# Patient Record
Sex: FEMALE | Race: WHITE | NOT HISPANIC OR LATINO | URBAN - METROPOLITAN AREA
[De-identification: names, ages, dates, MRNs, and addresses within clinical notes are randomized per-mention and may not be internally consistent; named-entity substitution may affect disease eponyms.]

---

## 2018-11-21 ENCOUNTER — INPATIENT (INPATIENT)
Facility: HOSPITAL | Age: 83
LOS: 5 days | Discharge: NURSING, REHAB OR LONG-TERM CARE | DRG: 185 | End: 2018-11-27
Attending: INTERNAL MEDICINE | Admitting: INTERNAL MEDICINE
Payer: MEDICARE

## 2018-11-21 VITALS
RESPIRATION RATE: 18 BRPM | DIASTOLIC BLOOD PRESSURE: 64 MMHG | OXYGEN SATURATION: 94 % | HEIGHT: 60 IN | TEMPERATURE: 99 F | HEART RATE: 54 BPM | SYSTOLIC BLOOD PRESSURE: 133 MMHG | WEIGHT: 115.08 LBS

## 2018-11-21 DIAGNOSIS — S22.39XA FRACTURE OF ONE RIB, UNSPECIFIED SIDE, INITIAL ENCOUNTER FOR CLOSED FRACTURE: ICD-10-CM

## 2018-11-21 LAB
ALBUMIN SERPL ELPH-MCNC: 3.6 G/DL — SIGNIFICANT CHANGE UP (ref 3.3–5)
ALP SERPL-CCNC: 52 U/L — SIGNIFICANT CHANGE UP (ref 40–120)
ALT FLD-CCNC: 18 U/L — SIGNIFICANT CHANGE UP (ref 12–78)
ANION GAP SERPL CALC-SCNC: 7 MMOL/L — SIGNIFICANT CHANGE UP (ref 5–17)
AST SERPL-CCNC: 18 U/L — SIGNIFICANT CHANGE UP (ref 15–37)
BASOPHILS # BLD AUTO: 0.05 K/UL — SIGNIFICANT CHANGE UP (ref 0–0.2)
BASOPHILS NFR BLD AUTO: 0.7 % — SIGNIFICANT CHANGE UP (ref 0–2)
BILIRUB SERPL-MCNC: 0.7 MG/DL — SIGNIFICANT CHANGE UP (ref 0.2–1.2)
BUN SERPL-MCNC: 23 MG/DL — SIGNIFICANT CHANGE UP (ref 7–23)
CALCIUM SERPL-MCNC: 8.9 MG/DL — SIGNIFICANT CHANGE UP (ref 8.5–10.1)
CHLORIDE SERPL-SCNC: 106 MMOL/L — SIGNIFICANT CHANGE UP (ref 96–108)
CO2 SERPL-SCNC: 27 MMOL/L — SIGNIFICANT CHANGE UP (ref 22–31)
CREAT SERPL-MCNC: 0.77 MG/DL — SIGNIFICANT CHANGE UP (ref 0.5–1.3)
EOSINOPHIL # BLD AUTO: 0.06 K/UL — SIGNIFICANT CHANGE UP (ref 0–0.5)
EOSINOPHIL NFR BLD AUTO: 0.8 % — SIGNIFICANT CHANGE UP (ref 0–6)
GLUCOSE SERPL-MCNC: 99 MG/DL — SIGNIFICANT CHANGE UP (ref 70–99)
HCT VFR BLD CALC: 44.9 % — SIGNIFICANT CHANGE UP (ref 34.5–45)
HGB BLD-MCNC: 14.2 G/DL — SIGNIFICANT CHANGE UP (ref 11.5–15.5)
IMM GRANULOCYTES NFR BLD AUTO: 0.3 % — SIGNIFICANT CHANGE UP (ref 0–1.5)
LYMPHOCYTES # BLD AUTO: 1.62 K/UL — SIGNIFICANT CHANGE UP (ref 1–3.3)
LYMPHOCYTES # BLD AUTO: 21.5 % — SIGNIFICANT CHANGE UP (ref 13–44)
MCHC RBC-ENTMCNC: 31.1 PG — SIGNIFICANT CHANGE UP (ref 27–34)
MCHC RBC-ENTMCNC: 31.6 GM/DL — LOW (ref 32–36)
MCV RBC AUTO: 98.2 FL — SIGNIFICANT CHANGE UP (ref 80–100)
MONOCYTES # BLD AUTO: 0.45 K/UL — SIGNIFICANT CHANGE UP (ref 0–0.9)
MONOCYTES NFR BLD AUTO: 6 % — SIGNIFICANT CHANGE UP (ref 2–14)
NEUTROPHILS # BLD AUTO: 5.35 K/UL — SIGNIFICANT CHANGE UP (ref 1.8–7.4)
NEUTROPHILS NFR BLD AUTO: 70.7 % — SIGNIFICANT CHANGE UP (ref 43–77)
PLATELET # BLD AUTO: 167 K/UL — SIGNIFICANT CHANGE UP (ref 150–400)
POTASSIUM SERPL-MCNC: 5 MMOL/L — SIGNIFICANT CHANGE UP (ref 3.5–5.3)
POTASSIUM SERPL-SCNC: 5 MMOL/L — SIGNIFICANT CHANGE UP (ref 3.5–5.3)
PROT SERPL-MCNC: 7 G/DL — SIGNIFICANT CHANGE UP (ref 6–8.3)
RBC # BLD: 4.57 M/UL — SIGNIFICANT CHANGE UP (ref 3.8–5.2)
RBC # FLD: 13.5 % — SIGNIFICANT CHANGE UP (ref 10.3–14.5)
SODIUM SERPL-SCNC: 140 MMOL/L — SIGNIFICANT CHANGE UP (ref 135–145)
WBC # BLD: 7.55 K/UL — SIGNIFICANT CHANGE UP (ref 3.8–10.5)
WBC # FLD AUTO: 7.55 K/UL — SIGNIFICANT CHANGE UP (ref 3.8–10.5)

## 2018-11-21 PROCEDURE — 70450 CT HEAD/BRAIN W/O DYE: CPT | Mod: 26

## 2018-11-21 PROCEDURE — 93010 ELECTROCARDIOGRAM REPORT: CPT | Mod: 76

## 2018-11-21 PROCEDURE — 74177 CT ABD & PELVIS W/CONTRAST: CPT | Mod: 26

## 2018-11-21 PROCEDURE — 71045 X-RAY EXAM CHEST 1 VIEW: CPT | Mod: 26

## 2018-11-21 PROCEDURE — 99285 EMERGENCY DEPT VISIT HI MDM: CPT

## 2018-11-21 PROCEDURE — 71260 CT THORAX DX C+: CPT | Mod: 26

## 2018-11-21 PROCEDURE — 72125 CT NECK SPINE W/O DYE: CPT | Mod: 26

## 2018-11-21 RX ORDER — SIMVASTATIN 20 MG/1
40 TABLET, FILM COATED ORAL AT BEDTIME
Qty: 0 | Refills: 0 | Status: DISCONTINUED | OUTPATIENT
Start: 2018-11-21 | End: 2018-11-27

## 2018-11-21 RX ORDER — ACETAMINOPHEN 500 MG
650 TABLET ORAL EVERY 6 HOURS
Qty: 0 | Refills: 0 | Status: DISCONTINUED | OUTPATIENT
Start: 2018-11-21 | End: 2018-11-27

## 2018-11-21 RX ORDER — CLOPIDOGREL BISULFATE 75 MG/1
75 TABLET, FILM COATED ORAL DAILY
Qty: 0 | Refills: 0 | Status: DISCONTINUED | OUTPATIENT
Start: 2018-11-21 | End: 2018-11-27

## 2018-11-21 RX ORDER — MORPHINE SULFATE 50 MG/1
2 CAPSULE, EXTENDED RELEASE ORAL ONCE
Qty: 0 | Refills: 0 | Status: DISCONTINUED | OUTPATIENT
Start: 2018-11-21 | End: 2018-11-21

## 2018-11-21 RX ORDER — BUDESONIDE AND FORMOTEROL FUMARATE DIHYDRATE 160; 4.5 UG/1; UG/1
2 AEROSOL RESPIRATORY (INHALATION)
Qty: 0 | Refills: 0 | Status: DISCONTINUED | OUTPATIENT
Start: 2018-11-21 | End: 2018-11-27

## 2018-11-21 RX ORDER — QUINAPRIL HYDROCHLORIDE 40 MG/1
1 TABLET, FILM COATED ORAL
Qty: 0 | Refills: 0 | COMMUNITY

## 2018-11-21 RX ORDER — SIMVASTATIN 20 MG/1
1 TABLET, FILM COATED ORAL
Qty: 0 | Refills: 0 | COMMUNITY

## 2018-11-21 RX ORDER — LIDOCAINE 4 G/100G
1 CREAM TOPICAL ONCE
Qty: 0 | Refills: 0 | Status: COMPLETED | OUTPATIENT
Start: 2018-11-21 | End: 2018-11-21

## 2018-11-21 RX ORDER — LISINOPRIL 2.5 MG/1
10 TABLET ORAL DAILY
Qty: 0 | Refills: 0 | Status: DISCONTINUED | OUTPATIENT
Start: 2018-11-21 | End: 2018-11-27

## 2018-11-21 RX ORDER — CLOPIDOGREL BISULFATE 75 MG/1
1 TABLET, FILM COATED ORAL
Qty: 0 | Refills: 0 | COMMUNITY

## 2018-11-21 RX ORDER — SODIUM CHLORIDE 9 MG/ML
1000 INJECTION INTRAMUSCULAR; INTRAVENOUS; SUBCUTANEOUS ONCE
Qty: 0 | Refills: 0 | Status: COMPLETED | OUTPATIENT
Start: 2018-11-21 | End: 2018-11-21

## 2018-11-21 RX ORDER — ATENOLOL 25 MG/1
50 TABLET ORAL DAILY
Qty: 0 | Refills: 0 | Status: DISCONTINUED | OUTPATIENT
Start: 2018-11-21 | End: 2018-11-22

## 2018-11-21 RX ORDER — ENOXAPARIN SODIUM 100 MG/ML
40 INJECTION SUBCUTANEOUS DAILY
Qty: 0 | Refills: 0 | Status: DISCONTINUED | OUTPATIENT
Start: 2018-11-21 | End: 2018-11-27

## 2018-11-21 RX ORDER — OXYCODONE AND ACETAMINOPHEN 5; 325 MG/1; MG/1
1 TABLET ORAL ONCE
Qty: 0 | Refills: 0 | Status: DISCONTINUED | OUTPATIENT
Start: 2018-11-21 | End: 2018-11-21

## 2018-11-21 RX ORDER — ONDANSETRON 8 MG/1
4 TABLET, FILM COATED ORAL ONCE
Qty: 0 | Refills: 0 | Status: COMPLETED | OUTPATIENT
Start: 2018-11-21 | End: 2018-11-21

## 2018-11-21 RX ORDER — OXYCODONE AND ACETAMINOPHEN 5; 325 MG/1; MG/1
1 TABLET ORAL EVERY 6 HOURS
Qty: 0 | Refills: 0 | Status: DISCONTINUED | OUTPATIENT
Start: 2018-11-21 | End: 2018-11-27

## 2018-11-21 RX ORDER — ONDANSETRON 8 MG/1
4 TABLET, FILM COATED ORAL EVERY 6 HOURS
Qty: 0 | Refills: 0 | Status: DISCONTINUED | OUTPATIENT
Start: 2018-11-21 | End: 2018-11-27

## 2018-11-21 RX ORDER — MORPHINE SULFATE 50 MG/1
2 CAPSULE, EXTENDED RELEASE ORAL EVERY 4 HOURS
Qty: 0 | Refills: 0 | Status: DISCONTINUED | OUTPATIENT
Start: 2018-11-21 | End: 2018-11-27

## 2018-11-21 RX ADMIN — SODIUM CHLORIDE 1000 MILLILITER(S): 9 INJECTION INTRAMUSCULAR; INTRAVENOUS; SUBCUTANEOUS at 15:50

## 2018-11-21 RX ADMIN — MORPHINE SULFATE 2 MILLIGRAM(S): 50 CAPSULE, EXTENDED RELEASE ORAL at 16:09

## 2018-11-21 RX ADMIN — MORPHINE SULFATE 2 MILLIGRAM(S): 50 CAPSULE, EXTENDED RELEASE ORAL at 18:06

## 2018-11-21 RX ADMIN — OXYCODONE AND ACETAMINOPHEN 1 TABLET(S): 5; 325 TABLET ORAL at 20:48

## 2018-11-21 RX ADMIN — ONDANSETRON 4 MILLIGRAM(S): 8 TABLET, FILM COATED ORAL at 15:50

## 2018-11-21 RX ADMIN — LIDOCAINE 1 PATCH: 4 CREAM TOPICAL at 20:46

## 2018-11-21 RX ADMIN — MORPHINE SULFATE 2 MILLIGRAM(S): 50 CAPSULE, EXTENDED RELEASE ORAL at 18:21

## 2018-11-21 RX ADMIN — MORPHINE SULFATE 2 MILLIGRAM(S): 50 CAPSULE, EXTENDED RELEASE ORAL at 15:49

## 2018-11-21 NOTE — ED PROVIDER NOTE - PROGRESS NOTE DETAILS
results discussed with family, discussed, mass found on lung on ct, call out to Dr Barajas for admission, awaiting call back spoke with Dr ABBEY Barajas, case discussed, will admit patient.

## 2018-11-21 NOTE — ED PROVIDER NOTE - ATTENDING CONTRIBUTION TO CARE
Pt with multiple falls in the last couple of months, here with right rib and abd pain, fell today, but has had this pain since oct 8th. GCA=15, no neck pain, pt moving all 4 ext, will image and send labs.

## 2018-11-21 NOTE — ED PROVIDER NOTE - OBJECTIVE STATEMENT
89 y female presents accompanied to ED by son and daughter in law, son states he was assisting patient in the bathroom today,  she was sitting on the commode, the house phone rang,  he went to answer it,  he heard his mother call him, found her sitting on the bathroom floor, back against the tub.  awake , alert, patient states she has right rib pain, and abdominal pain,  no nausea, no vomiting, states she did not hit her head, no neck pain, patient is on plavix, hx of stents.  PMD DR Carmen Moya

## 2018-11-21 NOTE — ED PROVIDER NOTE - MEDICAL DECISION MAKING DETAILS
mechanical fall, right rib pain, abdominal pain, on plavix, will obtain labs, ct head, neck, chest, abdomen

## 2018-11-21 NOTE — ED PROVIDER NOTE - CARE PLAN
Principal Discharge DX:	Fall, initial encounter  Secondary Diagnosis:	Rib pain on right side  Secondary Diagnosis:	Abdominal pain Principal Discharge DX:	Rib fractures  Secondary Diagnosis:	Rib pain on right side  Secondary Diagnosis:	Abdominal pain  Secondary Diagnosis:	Fall in home, initial encounter

## 2018-11-21 NOTE — ED ADULT NURSE NOTE - OBJECTIVE STATEMENT
Per patient and daughter, pt had unwitnessed fall at home. Pt now complains of right side pain, 10/10, and generalized pain. No other complaints at this time.

## 2018-11-22 DIAGNOSIS — I10 ESSENTIAL (PRIMARY) HYPERTENSION: ICD-10-CM

## 2018-11-22 DIAGNOSIS — R91.8 OTHER NONSPECIFIC ABNORMAL FINDING OF LUNG FIELD: ICD-10-CM
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
## 2018-11-22 DIAGNOSIS — I25.10 ATHEROSCLEROTIC HEART DISEASE OF NATIVE CORONARY ARTERY WITHOUT ANGINA PECTORIS: ICD-10-CM

## 2018-11-22 DIAGNOSIS — S22.39XA FRACTURE OF ONE RIB, UNSPECIFIED SIDE, INITIAL ENCOUNTER FOR CLOSED FRACTURE: ICD-10-CM

## 2018-11-22 DIAGNOSIS — I48.91 UNSPECIFIED ATRIAL FIBRILLATION: ICD-10-CM

## 2018-11-22 DIAGNOSIS — Z29.9 ENCOUNTER FOR PROPHYLACTIC MEASURES, UNSPECIFIED: ICD-10-CM

## 2018-11-22 LAB
ANION GAP SERPL CALC-SCNC: 8 MMOL/L — SIGNIFICANT CHANGE UP (ref 5–17)
BUN SERPL-MCNC: 22 MG/DL — SIGNIFICANT CHANGE UP (ref 7–23)
CALCIUM SERPL-MCNC: 9.3 MG/DL — SIGNIFICANT CHANGE UP (ref 8.5–10.1)
CHLORIDE SERPL-SCNC: 103 MMOL/L — SIGNIFICANT CHANGE UP (ref 96–108)
CO2 SERPL-SCNC: 29 MMOL/L — SIGNIFICANT CHANGE UP (ref 22–31)
CREAT SERPL-MCNC: 0.81 MG/DL — SIGNIFICANT CHANGE UP (ref 0.5–1.3)
GLUCOSE SERPL-MCNC: 82 MG/DL — SIGNIFICANT CHANGE UP (ref 70–99)
HCT VFR BLD CALC: 44.7 % — SIGNIFICANT CHANGE UP (ref 34.5–45)
HGB BLD-MCNC: 14.1 G/DL — SIGNIFICANT CHANGE UP (ref 11.5–15.5)
MAGNESIUM SERPL-MCNC: 2.2 MG/DL — SIGNIFICANT CHANGE UP (ref 1.6–2.6)
MCHC RBC-ENTMCNC: 31.5 GM/DL — LOW (ref 32–36)
MCHC RBC-ENTMCNC: 31.7 PG — SIGNIFICANT CHANGE UP (ref 27–34)
MCV RBC AUTO: 100.4 FL — HIGH (ref 80–100)
NRBC # BLD: 0 /100 WBCS — SIGNIFICANT CHANGE UP (ref 0–0)
PLATELET # BLD AUTO: 180 K/UL — SIGNIFICANT CHANGE UP (ref 150–400)
POTASSIUM SERPL-MCNC: 4.4 MMOL/L — SIGNIFICANT CHANGE UP (ref 3.5–5.3)
POTASSIUM SERPL-SCNC: 4.4 MMOL/L — SIGNIFICANT CHANGE UP (ref 3.5–5.3)
RBC # BLD: 4.45 M/UL — SIGNIFICANT CHANGE UP (ref 3.8–5.2)
RBC # FLD: 13.6 % — SIGNIFICANT CHANGE UP (ref 10.3–14.5)
SODIUM SERPL-SCNC: 140 MMOL/L — SIGNIFICANT CHANGE UP (ref 135–145)
T4 AB SER-ACNC: 5.8 UG/DL — SIGNIFICANT CHANGE UP (ref 4.6–12)
TSH SERPL-MCNC: 2.04 UIU/ML — SIGNIFICANT CHANGE UP (ref 0.36–3.74)
WBC # BLD: 9.03 K/UL — SIGNIFICANT CHANGE UP (ref 3.8–10.5)
WBC # FLD AUTO: 9.03 K/UL — SIGNIFICANT CHANGE UP (ref 3.8–10.5)

## 2018-11-22 PROCEDURE — 93306 TTE W/DOPPLER COMPLETE: CPT | Mod: 26

## 2018-11-22 PROCEDURE — 99223 1ST HOSP IP/OBS HIGH 75: CPT | Mod: 25

## 2018-11-22 RX ORDER — ATENOLOL 25 MG/1
25 TABLET ORAL DAILY
Qty: 0 | Refills: 0 | Status: DISCONTINUED | OUTPATIENT
Start: 2018-11-23 | End: 2018-11-27

## 2018-11-22 RX ORDER — LIDOCAINE 4 G/100G
1 CREAM TOPICAL DAILY
Qty: 0 | Refills: 0 | Status: DISCONTINUED | OUTPATIENT
Start: 2018-11-22 | End: 2018-11-27

## 2018-11-22 RX ADMIN — LIDOCAINE 1 PATCH: 4 CREAM TOPICAL at 23:09

## 2018-11-22 RX ADMIN — ENOXAPARIN SODIUM 40 MILLIGRAM(S): 100 INJECTION SUBCUTANEOUS at 11:33

## 2018-11-22 RX ADMIN — BUDESONIDE AND FORMOTEROL FUMARATE DIHYDRATE 2 PUFF(S): 160; 4.5 AEROSOL RESPIRATORY (INHALATION) at 05:46

## 2018-11-22 RX ADMIN — ATENOLOL 50 MILLIGRAM(S): 25 TABLET ORAL at 05:46

## 2018-11-22 RX ADMIN — MORPHINE SULFATE 2 MILLIGRAM(S): 50 CAPSULE, EXTENDED RELEASE ORAL at 01:47

## 2018-11-22 RX ADMIN — LIDOCAINE 1 PATCH: 4 CREAM TOPICAL at 11:38

## 2018-11-22 RX ADMIN — CLOPIDOGREL BISULFATE 75 MILLIGRAM(S): 75 TABLET, FILM COATED ORAL at 11:33

## 2018-11-22 RX ADMIN — LISINOPRIL 10 MILLIGRAM(S): 2.5 TABLET ORAL at 05:46

## 2018-11-22 RX ADMIN — SIMVASTATIN 40 MILLIGRAM(S): 20 TABLET, FILM COATED ORAL at 23:00

## 2018-11-22 RX ADMIN — LIDOCAINE 1 PATCH: 4 CREAM TOPICAL at 11:33

## 2018-11-22 RX ADMIN — MORPHINE SULFATE 2 MILLIGRAM(S): 50 CAPSULE, EXTENDED RELEASE ORAL at 02:30

## 2018-11-22 RX ADMIN — MORPHINE SULFATE 2 MILLIGRAM(S): 50 CAPSULE, EXTENDED RELEASE ORAL at 23:48

## 2018-11-22 RX ADMIN — BUDESONIDE AND FORMOTEROL FUMARATE DIHYDRATE 2 PUFF(S): 160; 4.5 AEROSOL RESPIRATORY (INHALATION) at 23:00

## 2018-11-22 NOTE — CONSULT NOTE ADULT - ASSESSMENT
Pt admitted after fall with rib fractures. Found to have RUL mass. Possible cancer.  Due to pt's advanced age, would do limited workup and treatment.

## 2018-11-22 NOTE — H&P ADULT - HISTORY OF PRESENT ILLNESS
89 y female presents accompanied to ED by son and daughter in law, son states he was assisting patient in the bathroom today,  she was sitting on the commode, the house phone rang,  he went to answer it,  he heard his mother call him, found her sitting on the bathroom floor, back against the tub.  awake , alert, patient states she has right rib pain, and abdominal pain,  no nausea, no vomiting, states she did not hit her head, no neck pain, patient is on plavix, hx of stents. 89 y female presents accompanied to ED by son and daughter in law, son states he was assisting patient in the bathroom today,  she was sitting on the commode, the house phone rang,  he went to answer it,  he heard his mother call him, found her sitting on the bathroom floor, back against the tub.  awake , alert, patient states she has right rib pain, and abdominal pain,  no nausea, no vomiting, states she did not hit her head, no neck pain, patient is on plavix, hx of stents.     Patient lives alone but family has been taking turns staying at her place taking care of her since her fall in early October. She did not come to the hospital for evaluation at that time. Family reports that they cannot take care of her any longer. She requires a lot of help with her ADLs. They have applied for Medicaid. They were told that the Medicaid aide would be in place in a few weeks. Their goal is to eventually move her back to her apartment with the 24-hour Medicaid aide.

## 2018-11-22 NOTE — H&P ADULT - RESPIRATORY
Addended by: DORA MORENO on: 6/20/2018 10:52 AM     Modules accepted: Orders     Breath Sounds equal & clear to percussion & auscultation, no accessory muscle use

## 2018-11-22 NOTE — PROVIDER CONTACT NOTE (CHANGE IN STATUS NOTIFICATION) - SITUATION
Patient heartrate in 40s. Patient with new Afib.
Patient EKG with lots of artifact. Catalina BROWN made aware. EKG repeated. EKG shows Afib. Patient with no history.

## 2018-11-22 NOTE — H&P ADULT - PROBLEM SELECTOR PLAN 2
Pulmonary consult  Outpatient PET scan  Would recommend limited work-up due to patient's advanced age Pulmonary consult  Outpatient PET scan if family wants  Would recommend limited work-up due to patient's advanced age  Family refusing any work-up

## 2018-11-22 NOTE — CONSULT NOTE ADULT - SUBJECTIVE AND OBJECTIVE BOX
PULMONARY/CRITICAL CARE        Patient is a 89y old  Female who presents with a chief complaint of fall. Found to have rul lung mass. Pt claims she had surgery for lung cancer in past on right. Denies sob, fever, chills.  BRIEF HOSPITAL COURSE: ***    Events last 24 hours: ***    PAST MEDICAL & SURGICAL HISTORY:  Stented coronary artery  HTN (hypertension)    Allergies    No Known Allergies    Intolerances      FAMILY HISTORY and SOCIAL: smoked 1ppd for 25 yrs until 30 yrs ago. No etoh.      Review of Systems:  CONSTITUTIONAL: No fever, chills, or fatigue  EYES: No eye pain, visual disturbances, or discharge  ENMT:  No difficulty hearing, tinnitus, vertigo; No sinus or throat pain  NECK: No pain or stiffness  RESPIRATORY: No cough, wheezing, chills or hemoptysis; No shortness of breath  CARDIOVASCULAR: No chest pain, palpitations, dizziness, or leg swelling  GASTROINTESTINAL: No abdominal or epigastric pain. No nausea, vomiting, or hematemesis; No diarrhea or constipation. No melena or hematochezia.  GENITOURINARY: No dysuria, frequency, hematuria, or incontinence  NEUROLOGICAL: No headaches, memory loss, loss of strength, numbness, or tremors  SKIN: No itching, burning, rashes, or lesions   MUSCULOSKELETAL: No joint pain or swelling; No muscle, back, or extremity pain  PSYCHIATRIC: No depression, anxiety, mood swings, or difficulty sleeping      Medications:    ATENolol  Tablet 50 milliGRAM(s) Oral daily  lisinopril 10 milliGRAM(s) Oral daily    buDESOnide  80 MICROgram(s)/formoterol 4.5 MICROgram(s) Inhaler 2 Puff(s) Inhalation two times a day    acetaminophen   Tablet .. 650 milliGRAM(s) Oral every 6 hours PRN  morphine  - Injectable 2 milliGRAM(s) IV Push every 4 hours PRN  ondansetron Injectable 4 milliGRAM(s) IV Push every 6 hours PRN  oxyCODONE    5 mG/acetaminophen 325 mG 1 Tablet(s) Oral every 6 hours PRN      clopidogrel Tablet 75 milliGRAM(s) Oral daily  enoxaparin Injectable 40 milliGRAM(s) SubCutaneous daily        simvastatin 40 milliGRAM(s) Oral at bedtime        lidocaine   Patch 1 Patch Transdermal daily            ICU Vital Signs Last 24 Hrs  T(C): 36.6 (22 Nov 2018 07:20), Max: 37.1 (21 Nov 2018 14:03)  T(F): 97.8 (22 Nov 2018 07:20), Max: 98.7 (21 Nov 2018 14:03)  HR: 70 (22 Nov 2018 07:20) (54 - 70)  BP: 119/64 (22 Nov 2018 07:20) (117/57 - 153/70)  BP(mean): --  ABP: --  ABP(mean): --  RR: 19 (22 Nov 2018 07:20) (16 - 19)  SpO2: 95% (22 Nov 2018 07:20) (94% - 97%)    Vital Signs Last 24 Hrs  T(C): 36.6 (22 Nov 2018 07:20), Max: 37.1 (21 Nov 2018 14:03)  T(F): 97.8 (22 Nov 2018 07:20), Max: 98.7 (21 Nov 2018 14:03)  HR: 70 (22 Nov 2018 07:20) (54 - 70)  BP: 119/64 (22 Nov 2018 07:20) (117/57 - 153/70)  BP(mean): --  RR: 19 (22 Nov 2018 07:20) (16 - 19)  SpO2: 95% (22 Nov 2018 07:20) (94% - 97%)        I&O's Detail    21 Nov 2018 07:01  -  22 Nov 2018 07:00  --------------------------------------------------------  IN:    Oral Fluid: 200 mL  Total IN: 200 mL    OUT:  Total OUT: 0 mL    Total NET: 200 mL            LABS:                        14.1   9.03  )-----------( 180      ( 22 Nov 2018 07:22 )             44.7     11-22    140  |  103  |  22  ----------------------------<  82  4.4   |  29  |  0.81    Ca    9.3      22 Nov 2018 07:22  Mg     2.2     11-22    TPro  7.0  /  Alb  3.6  /  TBili  0.7  /  DBili  x   /  AST  18  /  ALT  18  /  AlkPhos  52  11-21          CAPILLARY BLOOD GLUCOSE            CULTURES:      Physical Examination:    General: No acute distress.  elderly female    HEENT: Pupils equal, reactive to light.  Symmetric.    PULM: Clear to auscultation bilaterally, no significant sputum production    CVS: Regular rate and rhythm, no murmurs, rubs, or gallops    ABD: Soft, nondistended, nontender, normoactive bowel sounds, no masses    EXT: No edema, nontender    SKIN: Warm and well perfused, no rashes noted.    NEURO: Alert, oriented, interactive, nonfocal    RADIOLOGY: ***< from: CT Chest w/ IV Cont (11.21.18 @ 19:02) >  EXAM:  CT CHEST IC                            *** ADDENDUM 11/21/2018  ***    There are 2 nondisplaced posteriorly situated fractures of right ribs,   11th and 12th ribs.      *** END OF ADDENDUM 11/21/2018  ***      PROCEDURE DATE:  11/21/2018          INTERPRETATION:  Clinical information: Right-sided rib pain    CT study of the chest abdomen and pelvis.    Axial images obtained, coronal and sagittal images computer reformatted.    IV contrast material administered.    95 cc of Omnipaque 350 intravenously administered, 5 cc discarded.    No prior studies present for comparison    Chest: Global cardiomegaly present. No evidence of pericardial effusion.   No evidence of thoracic aortic aneurysm. Central airway intact. Thyroid   gland not enlarged. No mediastinal lesions evident. No hilar lesions   evident.    Pleural base consolidation, right upper lobe. Anterior posterior   dimension, 6 cm, 2.5 cm in width, 3 cm vertical height. Cannot exclude a   neoplasm. This lesion abuts the minor fissure. Advise follow-up   evaluation.     No drainable pleural effusion. No pneumothorax. Bullous changes right   lower lobe. Small fissural fluid collection present.   Severe dorsal   kyphosis present. Compression fracture of T10, follow-up with bone scan.              Abdomen-pelvis: Hepatic parenchyma homogeneous. The gallbladder is filled   with stones. The spleen is not enlarged. The pancreas is atrophic.   Atherosclerotic changes, abdominal aorta, no evidence of aneurysm. No   retroperitoneal lymphadenopathy. No biliary ductal dilatation or ascites.   No adrenal lesions.    Large cyst lower pole of left kidney. No hydronephrotic changes.    No bowel obstruction. Diverticulosis present. Urinary bladder is filled,   no stones evident. Noevidence of free pelvic fluid. Inguinal regions   intact. No acute appearing osseous abnormalities. Calcified disc L5-S1.   Possible hemangioma of the bone, L2.        IMPRESSION: See findings described above.                ***Please see the addendum at the top of this report. It may contain   additional important information or changes.****          MALLY BRYANT M.D.,ATTENDING RADIOLOGIST  This document has been electronically signed. Nov 21 2018  7:24PM  Addend:  MALLY BRYANT M.D.,ATTENDING RADIOLOGIST  This addendum was electronically signed on: Nov 21 2018  7:39PM.        < end of copied text >      CRITICAL CARE TIME SPENT: ***

## 2018-11-22 NOTE — CONSULT NOTE ADULT - ASSESSMENT
Mrs. Barnes is a 89 year old with reported CAD on Plavix, here with a fall. Found to have Lung mass and rib fractures  Found to be in Atrial fibrillation with slow ventricular response on EKG. Unclear if there is an AF history.   The etiology of her fall is unclear, and syncope is a possibility    - Continue to watch on telemetry  - No sign of acute ischemia.  - Slow AF on telemetry, monitor for bradycardia and pauses  - Decrease atenolol to 25 mg po daily  - Would be hesitant to start a/c given her propensity for falls and advanced age  - Continue Plavix 75 mg po daily  - Continue statin  - Can check echocardiogram  - Watch creatinine and electrolytes  - Will follow with you.

## 2018-11-22 NOTE — CONSULT NOTE ADULT - SUBJECTIVE AND OBJECTIVE BOX
[facility] - Shauna Raphael, Sun, Enoc, Ronaldo, Jose Juan Silva  Office Number: [phone]    Initial Consult Note    CHIEF COMPLAINT: Patient is a 89y old  Female who presents with a chief complaint of fall.    HPI:  She is a very poor historian, and history has been obtained from chart.    She is a 89 y female brought in yesterday after fall. She was sitting on the commode, the house phone rang,  the son went to answer it,  he heard his mother call him, found her sitting on the bathroom floor, back against the tub.  awake , alert, patient states she has right rib pain, and abdominal pain,  no nausea, no vomiting, states she did not hit her head, no neck pain, patient is on plavix, hx of stents.      Found to be in slow atrial fibrillation  No history of atrial fibrillation per notes. On Plavix for CAD.  Takes atenolol for HTN  Denies chest pain and difficulty breathing.      PAST MEDICAL & SURGICAL HISTORY:  Stented coronary artery  HTN (hypertension)      SOCIAL HISTORY:  Unknown    FAMILY HISTORY:  Unknown    MEDICATIONS  (STANDING):  ATENolol  Tablet 50 milliGRAM(s) Oral daily  buDESOnide  80 MICROgram(s)/formoterol 4.5 MICROgram(s) Inhaler 2 Puff(s) Inhalation two times a day  clopidogrel Tablet 75 milliGRAM(s) Oral daily  enoxaparin Injectable 40 milliGRAM(s) SubCutaneous daily  lidocaine   Patch 1 Patch Transdermal daily  lisinopril 10 milliGRAM(s) Oral daily  simvastatin 40 milliGRAM(s) Oral at bedtime    MEDICATIONS  (PRN):  acetaminophen   Tablet .. 650 milliGRAM(s) Oral every 6 hours PRN Temp greater or equal to 38C (100.4F), Mild Pain (1 - 3)  morphine  - Injectable 2 milliGRAM(s) IV Push every 4 hours PRN Severe Pain (7 - 10)  ondansetron Injectable 4 milliGRAM(s) IV Push every 6 hours PRN Nausea and/or Vomiting  oxyCODONE    5 mG/acetaminophen 325 mG 1 Tablet(s) Oral every 6 hours PRN Moderate Pain (4 - 6)      Allergies    No Known Allergies    Intolerances        REVIEW OF SYSTEMS:    CONSTITUTIONAL: No weakness, fevers or chills  EYES/ENT: No visual changes;  No vertigo or throat pain   NECK: No pain or stiffness  RESPIRATORY: No cough, wheezing, hemoptysis; No shortness of breath  CARDIOVASCULAR: No chest pain or palpitations  GASTROINTESTINAL: No abdominal pain. No nausea, vomiting, or hematemesis; No diarrhea or constipation. No melena or hematochezia.  GENITOURINARY: No dysuria, frequency or hematuria  NEUROLOGICAL: No numbness or weakness  SKIN: No itching or rash  All other review of systems is negative unless indicated above    VITAL SIGNS:   Vital Signs Last 24 Hrs  T(C): 36.6 (22 Nov 2018 07:20), Max: 37.1 (21 Nov 2018 14:03)  T(F): 97.8 (22 Nov 2018 07:20), Max: 98.7 (21 Nov 2018 14:03)  HR: 70 (22 Nov 2018 07:20) (54 - 70)  BP: 119/64 (22 Nov 2018 07:20) (117/57 - 153/70)  BP(mean): --  RR: 19 (22 Nov 2018 07:20) (16 - 19)  SpO2: 95% (22 Nov 2018 07:20) (94% - 97%)    I&O's Summary    21 Nov 2018 07:01  -  22 Nov 2018 07:00  --------------------------------------------------------  IN: 200 mL / OUT: 0 mL / NET: 200 mL        On Exam:    Constitutional: NAD, alert and oriented x 2  Lungs:  decreased bs at bases, No wheezing, rales or rhonchi  Cardiovascular: RRR.  S1 and S2 positive.  + systolic murmur II/VI, no rubs, gallops or clicks  Gastrointestinal: Bowel Sounds present, soft, nontender.   Lymph: No peripheral edema. No cervical lymphadenopathy.  Neurological: Alert, no focal deficits  Skin: + back erythema  Psych:  Mood & affect appropriate.    LABS: All Labs Reviewed:                        14.1   9.03  )-----------( 180      ( 22 Nov 2018 07:22 )             44.7                         14.2   7.55  )-----------( 167      ( 21 Nov 2018 15:47 )             44.9     22 Nov 2018 07:22    140    |  103    |  22     ----------------------------<  82     4.4     |  29     |  0.81   21 Nov 2018 15:47    140    |  106    |  23     ----------------------------<  99     5.0     |  27     |  0.77     Ca    9.3        22 Nov 2018 07:22  Ca    8.9        21 Nov 2018 15:47  Mg     2.2       22 Nov 2018 07:22    TPro  7.0    /  Alb  3.6    /  TBili  0.7    /  DBili  x      /  AST  18     /  ALT  18     /  AlkPhos  52     21 Nov 2018 15:47          Blood Culture:     11-22 @ 07:22  TSH: 2.04      RADIOLOGY:    EKG: AF, possible LVH, LAD, non-specific QRS widening

## 2018-11-22 NOTE — PROVIDER CONTACT NOTE (CHANGE IN STATUS NOTIFICATION) - ACTION/TREATMENT ORDERED:
Dr. Barajas aware, patient to go to telemetry.
Dr. Barajas aware. Patient to be placed on remote telemetry.

## 2018-11-23 PROCEDURE — 99232 SBSQ HOSP IP/OBS MODERATE 35: CPT

## 2018-11-23 RX ORDER — DOCUSATE SODIUM 100 MG
100 CAPSULE ORAL THREE TIMES A DAY
Qty: 0 | Refills: 0 | Status: DISCONTINUED | OUTPATIENT
Start: 2018-11-23 | End: 2018-11-27

## 2018-11-23 RX ORDER — POLYETHYLENE GLYCOL 3350 17 G/17G
17 POWDER, FOR SOLUTION ORAL ONCE
Qty: 0 | Refills: 0 | Status: COMPLETED | OUTPATIENT
Start: 2018-11-23 | End: 2018-11-23

## 2018-11-23 RX ORDER — POLYETHYLENE GLYCOL 3350 17 G/17G
17 POWDER, FOR SOLUTION ORAL DAILY
Qty: 0 | Refills: 0 | Status: DISCONTINUED | OUTPATIENT
Start: 2018-11-23 | End: 2018-11-27

## 2018-11-23 RX ORDER — SENNA PLUS 8.6 MG/1
2 TABLET ORAL AT BEDTIME
Qty: 0 | Refills: 0 | Status: DISCONTINUED | OUTPATIENT
Start: 2018-11-23 | End: 2018-11-27

## 2018-11-23 RX ORDER — HYDROCORTISONE 1 %
1 OINTMENT (GRAM) TOPICAL
Qty: 0 | Refills: 0 | Status: DISCONTINUED | OUTPATIENT
Start: 2018-11-23 | End: 2018-11-27

## 2018-11-23 RX ADMIN — SIMVASTATIN 40 MILLIGRAM(S): 20 TABLET, FILM COATED ORAL at 21:26

## 2018-11-23 RX ADMIN — MORPHINE SULFATE 2 MILLIGRAM(S): 50 CAPSULE, EXTENDED RELEASE ORAL at 19:38

## 2018-11-23 RX ADMIN — Medication 1 APPLICATION(S): at 18:54

## 2018-11-23 RX ADMIN — CLOPIDOGREL BISULFATE 75 MILLIGRAM(S): 75 TABLET, FILM COATED ORAL at 12:02

## 2018-11-23 RX ADMIN — POLYETHYLENE GLYCOL 3350 17 GRAM(S): 17 POWDER, FOR SOLUTION ORAL at 18:55

## 2018-11-23 RX ADMIN — BUDESONIDE AND FORMOTEROL FUMARATE DIHYDRATE 2 PUFF(S): 160; 4.5 AEROSOL RESPIRATORY (INHALATION) at 10:48

## 2018-11-23 RX ADMIN — Medication 100 MILLIGRAM(S): at 21:26

## 2018-11-23 RX ADMIN — MORPHINE SULFATE 2 MILLIGRAM(S): 50 CAPSULE, EXTENDED RELEASE ORAL at 00:30

## 2018-11-23 RX ADMIN — LIDOCAINE 1 PATCH: 4 CREAM TOPICAL at 12:03

## 2018-11-23 RX ADMIN — LIDOCAINE 1 PATCH: 4 CREAM TOPICAL at 21:17

## 2018-11-23 RX ADMIN — MORPHINE SULFATE 2 MILLIGRAM(S): 50 CAPSULE, EXTENDED RELEASE ORAL at 20:00

## 2018-11-23 RX ADMIN — LISINOPRIL 10 MILLIGRAM(S): 2.5 TABLET ORAL at 05:48

## 2018-11-23 RX ADMIN — ENOXAPARIN SODIUM 40 MILLIGRAM(S): 100 INJECTION SUBCUTANEOUS at 12:05

## 2018-11-23 RX ADMIN — BUDESONIDE AND FORMOTEROL FUMARATE DIHYDRATE 2 PUFF(S): 160; 4.5 AEROSOL RESPIRATORY (INHALATION) at 18:56

## 2018-11-23 RX ADMIN — SENNA PLUS 2 TABLET(S): 8.6 TABLET ORAL at 21:26

## 2018-11-23 NOTE — PROGRESS NOTE ADULT - SUBJECTIVE AND OBJECTIVE BOX
Patient is a 89y old  Female who presents with a chief complaint of fall (23 Nov 2018 10:23)      INTERVAL HPI/OVERNIGHT EVENTS: Patient seen and examined. NAD. No complaints.    Vital Signs Last 24 Hrs  T(C): 36.9 (23 Nov 2018 11:55), Max: 36.9 (23 Nov 2018 11:55)  T(F): 98.5 (23 Nov 2018 11:55), Max: 98.5 (23 Nov 2018 11:55)  HR: 96 (23 Nov 2018 11:55) (53 - 96)  BP: 101/63 (23 Nov 2018 11:55) (99/63 - 120/67)  BP(mean): --  RR: 17 (23 Nov 2018 11:55) (17 - 20)  SpO2: 97% (23 Nov 2018 11:55) (92% - 98%)    11-22    140  |  103  |  22  ----------------------------<  82  4.4   |  29  |  0.81    Ca    9.3      22 Nov 2018 07:22  Mg     2.2     11-22    TPro  7.0  /  Alb  3.6  /  TBili  0.7  /  DBili  x   /  AST  18  /  ALT  18  /  AlkPhos  52  11-21                          14.1   9.03  )-----------( 180      ( 22 Nov 2018 07:22 )             44.7       CAPILLARY BLOOD GLUCOSE                  acetaminophen   Tablet .. 650 milliGRAM(s) Oral every 6 hours PRN  ATENolol  Tablet 25 milliGRAM(s) Oral daily  buDESOnide  80 MICROgram(s)/formoterol 4.5 MICROgram(s) Inhaler 2 Puff(s) Inhalation two times a day  clopidogrel Tablet 75 milliGRAM(s) Oral daily  enoxaparin Injectable 40 milliGRAM(s) SubCutaneous daily  lidocaine   Patch 1 Patch Transdermal daily  lisinopril 10 milliGRAM(s) Oral daily  morphine  - Injectable 2 milliGRAM(s) IV Push every 4 hours PRN  ondansetron Injectable 4 milliGRAM(s) IV Push every 6 hours PRN  oxyCODONE    5 mG/acetaminophen 325 mG 1 Tablet(s) Oral every 6 hours PRN  simvastatin 40 milliGRAM(s) Oral at bedtime              REVIEW OF SYSTEMS:  CONSTITUTIONAL: No fever, no weight loss, or no fatigue  NECK: No pain, no stiffness  RESPIRATORY: No cough, no wheezing, no chills, no hemoptysis, No shortness of breath  CARDIOVASCULAR: No chest pain, no palpitations, no dizziness, no leg swelling  GASTROINTESTINAL: No abdominal pain. No nausea, no vomiting, no hematemesis; No diarrhea, no constipation. No melena, no hematochezia.  GENITOURINARY: No dysuria, no frequency, no hematuria, no incontinence  NEUROLOGICAL: No headaches, no loss of strength, no numbness, no tremors  SKIN: No itching, no burning  MUSCULOSKELETAL: No joint pain, no swelling; No muscle, no back, no extremity pain  PSYCHIATRIC: No depression, no mood swings,   HEME/LYMPH: No easy bruising, no bleeding gums  ALLERY AND IMMUNOLOGIC: No hives       Consultant(s) Notes Reviewed:  [X] YES  [ ] NO    PHYSICAL EXAM:  GENERAL: NAD  HEAD:  Atraumatic, Normocephalic  EYES: EOMI, PERRLA, conjunctiva and sclera clear  ENMT: No tonsillar erythema, exudates, or enlargement; Moist mucous membranes  NECK: Supple, No JVD  NERVOUS SYSTEM:  Awake & alert  CHEST/LUNG: Clear to auscultation bilaterally; No rales, rhonchi, wheezing,  HEART: Regular rate and rhythm  ABDOMEN: Soft, Nontender, Nondistended; Bowel sounds present  EXTREMITIES:  No clubbing, cyanosis, or edema  LYMPH: No lymphadenopathy noted  SKIN: + rash on back      Advanced care planning discussed with patient/family [X] YES   [ ] NO    Advanced care planning discussed with patient/family. Advanced care planning forms reviewed/discussed/completed. 20 minutes spent.

## 2018-11-23 NOTE — PHYSICAL THERAPY INITIAL EVALUATION ADULT - PERTINENT HX OF CURRENT PROBLEM, REHAB EVAL
89 y F presents after pt's son found pt sitting on the bathroom floor. Pt c/o right rib and abdominal pain. Pt's family assisting pt since fall in early October. Family reports that they cannot care for her any longer. CT(head) No acute abnormality identified. Cervical CT negative for acute fracture. cXR: : Possible mass-neoplasm, right upper lobe. 2 right lower rib fractures.

## 2018-11-23 NOTE — PHYSICAL THERAPY INITIAL EVALUATION ADULT - ADDITIONAL COMMENTS
Pt lives in house in w/ no steps to enter.  Pt report since last fall, has required more assist with all functional mobility.  Family has been there to assist her but can no longer care for her.

## 2018-11-23 NOTE — PROGRESS NOTE ADULT - ASSESSMENT
Mrs. Barnes is a 89 year old with reported CAD on Plavix, here with a fall. Found to have Lung mass and rib fractures  Found to be in Atrial fibrillation with slow ventricular response on EKG. Unclear if there is an AF history.   The etiology of her fall is unclear, and syncope is a possibility    - Cont telemetry  - No sign of acute ischemia.  - Slow AF on telemetry, monitor for bradycardia and pauses.  Rate to 47 to 70s.  No pauses noted.    - Decrease atenolol to 25 mg po daily  - Would be hesitant to start a/c given her propensity for falls and advanced age  - Continue Plavix 75 mg po daily  - Continue statin  - Echocardiogram with grossly normal LV systolic function, mild LVH, severe AS, mild AI, mod MR, mod-severe TR, Biatrial Enlargement, mildly elevated pulmonary pressures.    - Watch creatinine and electrolytes.  Maintain K > 4 and Mag >2.  Replete prn.    - Will follow with you.  - D/C planning to [facility]  Cardiology Mrs. Barnes is a 89 year old with reported CAD on Plavix, here with a fall. Found to have Lung mass and rib fractures  Found to be in Atrial fibrillation with slow ventricular response on EKG. Unclear if there is an AF history.   The etiology of her fall is unclear, and syncope is a possibility    - Cont telemetry  - No sign of acute ischemia.  - Slow AF on telemetry, monitor for bradycardia and pauses.  Rate to 47 to 70s.  No pauses noted.    - Decrease atenolol to 25 mg po daily  - BP labile would check orthostatics if able  - Would be hesitant to start a/c given her propensity for falls and advanced age  - Continue Plavix 75 mg po daily  - Continue statin  - Echocardiogram with grossly normal LV systolic function, mild LVH, severe AS, mild AI, mod MR, mod-severe TR, Biatrial Enlargement, mildly elevated pulmonary pressures.    - Watch creatinine and electrolytes.  Maintain K > 4 and Mag >2.  Replete prn.    - Will follow with you.  - D/C planning to [facility]  Cardiology Mrs. Barnes is a 89 year old with reported CAD on Plavix, here with a fall. Found to have Lung mass and rib fractures  Found to be in Atrial fibrillation with slow ventricular response on EKG. Unclear if there is an AF history.   The etiology of her fall is unclear, and syncope is a possibility    - Cont telemetry  - No sign of acute ischemia.  - Slow AF on telemetry, monitor for bradycardia and pauses.  Rate to 47 to 70s.  No pauses noted.    - Continue atenolol to 25 mg po daily  - BP labile would check orthostatics if able  - Would be hesitant to start a/c given her propensity for falls and advanced age  - Continue Plavix 75 mg po daily  - Continue statin  - Echocardiogram with grossly normal LV systolic function, mild LVH, severe AS, mild AI, mod MR, mod-severe TR, Biatrial Enlargement, mildly elevated pulmonary pressures.    - Watch creatinine and electrolytes.  Maintain K > 4 and Mag >2.  Replete prn.    - Will follow with you.  - D/C planning to [facility]  Cardiology

## 2018-11-23 NOTE — PROGRESS NOTE ADULT - SUBJECTIVE AND OBJECTIVE BOX
PULMONARY/CRITICAL CARE      INTERVAL HPI/OVERNIGHT EVENTS:    89y FemaleHPI:  Denies sob. No fever. no pain.  89 y female presents accompanied to ED by son and daughter in law, son states he was assisting patient in the bathroom today,  she was sitting on the commode, the house phone rang,  he went to answer it,  he heard his mother call him, found her sitting on the bathroom floor, back against the tub.  awake , alert, patient states she has right rib pain, and abdominal pain,  no nausea, no vomiting, states she did not hit her head, no neck pain, patient is on plavix, hx of stents.     Patient lives alone but family has been taking turns staying at her place taking care of her since her fall in early October. She did not come to the hospital for evaluation at that time. Family reports that they cannot take care of her any longer. She requires a lot of help with her ADLs. They have applied for Medicaid. They were told that the Medicaid aide would be in place in a few weeks. Their goal is to eventually move her back to her apartment with the 24-hour Medicaid aide. (22 Nov 2018 11:48)        PAST MEDICAL & SURGICAL HISTORY:  Stented coronary artery  HTN (hypertension)        ICU Vital Signs Last 24 Hrs  T(C): 36.3 (23 Nov 2018 07:54), Max: 36.6 (22 Nov 2018 20:51)  T(F): 97.4 (23 Nov 2018 07:54), Max: 97.8 (22 Nov 2018 20:51)  HR: 82 (23 Nov 2018 07:54) (53 - 82)  BP: 113/77 (23 Nov 2018 07:54) (99/63 - 120/67)  BP(mean): --  ABP: --  ABP(mean): --  RR: 18 (23 Nov 2018 07:54) (18 - 20)  SpO2: 97% (23 Nov 2018 07:54) (92% - 99%)    Qtts:     I&O's Summary    22 Nov 2018 07:01  -  23 Nov 2018 07:00  --------------------------------------------------------  IN: 120 mL / OUT: 0 mL / NET: 120 mL            REVIEW OF SYSTEMS:    CONSTITUTIONAL: No fever, weight loss, or fatigue  EYES: No eye pain, visual disturbances, or discharge  ENMT:  No difficulty hearing, tinnitus, vertigo; No sinus or throat pain  RESPIRATORY: No cough, wheezing, chills or hemoptysis; No shortness of breath  CARDIOVASCULAR: No chest pain, palpitations, dizziness, or leg swelling  GASTROINTESTINAL: No abdominal or epigastric pain. No nausea, vomiting, or hematemesis; No diarrhea or constipation. No melena or hematochezia.  GENITOURINARY: No dysuria, frequency, hematuria, or incontinence  NEUROLOGICAL: No headaches, some memory loss, loss of strength, numbness, or tremors  SKIN: No itching, burning, rashes, or lesions   LYMPH NODES: No enlarged glands  ENDOCRINE: No heat or cold intolerance; No hair loss  MUSCULOSKELETAL: No joint pain or swelling; No muscle, back, or extremity pain, no calf tenderness    PHYSICAL EXAM:    GENERAL: NAD, well-groomed, well-developed,   HEAD:  Atraumatic, Normocephalic  EYES: EOMI, PERRLA, conjunctiva and sclera clear  ENMT: No tonsillar erythema, exudates, or enlargement; Moist mucous membranes, Good dentition, No lesions  NECK: Supple, No JVD, Normal thyroid  NERVOUS SYSTEM:  Alert , slightly confused. Motor Strength 5/5 B/L upper and lower extremities  CHEST/LUNG: Clear to percussion bilaterally; No rales, rhonchi, wheezing, or rubs Decreased bs  HEART: Regular rate and rhythm; No murmurs, rubs, or gallops  ABDOMEN: Soft, Nontender, Nondistended; Bowel sounds present  EXTREMITIES:  2+ Peripheral Pulses, No clubbing, cyanosis, or edema  LYMPH: No lymphadenopathy noted  SKIN: No rashes or lesions        LABS:                        14.1   9.03  )-----------( 180      ( 22 Nov 2018 07:22 )             44.7     11-22    140  |  103  |  22  ----------------------------<  82  4.4   |  29  |  0.81    Ca    9.3      22 Nov 2018 07:22  Mg     2.2     11-22    TPro  7.0  /  Alb  3.6  /  TBili  0.7  /  DBili  x   /  AST  18  /  ALT  18  /  AlkPhos  52  11-21          vanco through     RADIOLOGY & ADDITIONAL STUDIES:    < from: CT Chest w/ IV Cont (11.21.18 @ 19:02) >  EXAM:  CT CHEST IC                            *** ADDENDUM 11/21/2018  ***    There are 2 nondisplaced posteriorly situated fractures of right ribs,   11th and 12th ribs.      *** END OF ADDENDUM 11/21/2018  ***      PROCEDURE DATE:  11/21/2018          INTERPRETATION:  Clinical information: Right-sided rib pain    CT study of the chest abdomen and pelvis.    Axial images obtained, coronal and sagittal images computer reformatted.    IV contrast material administered.    95 cc of Omnipaque 350 intravenously administered, 5 cc discarded.    No prior studies present for comparison    Chest: Global cardiomegaly present. No evidence of pericardial effusion.   No evidence of thoracic aortic aneurysm. Central airway intact. Thyroid   gland not enlarged. No mediastinal lesions evident. No hilar lesions   evident.    Pleural base consolidation, right upper lobe. Anterior posterior   dimension, 6 cm, 2.5 cm in width, 3 cm vertical height. Cannot exclude a   neoplasm. This lesion abuts the minor fissure. Advise follow-up   evaluation.     No drainable pleural effusion. No pneumothorax. Bullous changes right   lower lobe. Small fissural fluid collection present.   Severe dorsal   kyphosis present. Compression fracture of T10, follow-up with bone scan.              Abdomen-pelvis: Hepatic parenchyma homogeneous. The gallbladder is filled   with stones. The spleen is not enlarged. The pancreas is atrophic.   Atherosclerotic changes, abdominal aorta, no evidence of aneurysm. No   retroperitoneal lymphadenopathy. No biliary ductal dilatation or ascites.   No adrenal lesions.    Large cyst lower pole of left kidney. No hydronephrotic changes.    No bowel obstruction. Diverticulosis present. Urinary bladder is filled,   no stones evident. Noevidence of free pelvic fluid. Inguinal regions   intact. No acute appearing osseous abnormalities. Calcified disc L5-S1.   Possible hemangioma of the bone, L2.        IMPRESSION: See findings described above.                ***Please see the addendum at the top of this report. It may contain   additional important information or changes.****          MALLY BRYANT M.D.,ATTENDING RADIOLOGIST  This document has been electronically signed. Nov 21 2018  7:24PM  Addend:  MALLY BRYANT M.D.,ATTENDING RADIOLOGIST  This addendum was electronically signed on: Nov 21 2018  7:39PM.        < end of copied text >    CRITICAL CARE TIME SPENT:

## 2018-11-23 NOTE — PROGRESS NOTE ADULT - ASSESSMENT
Pt admitted after fall with rib fractures. Found to have RUL mass. Possible cancer.  Due to pt's advanced age, would do limited workup and treatment.  Should be stable for dc soon

## 2018-11-23 NOTE — PROGRESS NOTE ADULT - SUBJECTIVE AND OBJECTIVE BOX
[facility] -- Shauna Raphael, Sun, Enoc, Ronaldo, Jose Juan Silva  Office # [phone]      Follow Up:  AF    Subjective/Observations: Seen and examined.  Sitting up in bed awake and alert.  No new complaints.  No events noted.  NAD.        REVIEW OF SYSTEMS: All other review of systems is negative unless indicated above    PAST MEDICAL & SURGICAL HISTORY:  Stented coronary artery  HTN (hypertension)      MEDICATIONS  (STANDING):  ATENolol  Tablet 25 milliGRAM(s) Oral daily  buDESOnide  80 MICROgram(s)/formoterol 4.5 MICROgram(s) Inhaler 2 Puff(s) Inhalation two times a day  clopidogrel Tablet 75 milliGRAM(s) Oral daily  enoxaparin Injectable 40 milliGRAM(s) SubCutaneous daily  hydrocortisone 1% Cream 1 Application(s) Topical two times a day  lidocaine   Patch 1 Patch Transdermal daily  lisinopril 10 milliGRAM(s) Oral daily  simvastatin 40 milliGRAM(s) Oral at bedtime    MEDICATIONS  (PRN):  acetaminophen   Tablet .. 650 milliGRAM(s) Oral every 6 hours PRN Temp greater or equal to 38C (100.4F), Mild Pain (1 - 3)  morphine  - Injectable 2 milliGRAM(s) IV Push every 4 hours PRN Severe Pain (7 - 10)  ondansetron Injectable 4 milliGRAM(s) IV Push every 6 hours PRN Nausea and/or Vomiting  oxyCODONE    5 mG/acetaminophen 325 mG 1 Tablet(s) Oral every 6 hours PRN Moderate Pain (4 - 6)      Allergies    No Known Allergies    Intolerances            Vital Signs Last 24 Hrs  T(C): 36.9 (23 Nov 2018 11:55), Max: 36.9 (23 Nov 2018 11:55)  T(F): 98.5 (23 Nov 2018 11:55), Max: 98.5 (23 Nov 2018 11:55)  HR: 96 (23 Nov 2018 11:55) (53 - 96)  BP: 101/63 (23 Nov 2018 11:55) (99/63 - 120/67)  BP(mean): --  RR: 17 (23 Nov 2018 11:55) (17 - 20)  SpO2: 97% (23 Nov 2018 11:55) (92% - 98%)    I&O's Summary    22 Nov 2018 07:01 - 23 Nov 2018 07:00  --------------------------------------------------------  IN: 120 mL / OUT: 0 mL / NET: 120 mL          PHYSICAL EXAM:  TELE: AF 70s low to 44  Constitutional: NAD, awake and alert, frail  HEENT: Moist Mucous Membranes, Anicteric  Pulmonary: Non-labored, breath sounds with crackles right base   Cardiovascular: Irregular, S1 and S2, No murmurs, rubs, gallops or clicks  Gastrointestinal: Bowel Sounds present, soft, nontender.   Lymph: No peripheral edema. No lymphadenopathy.  Skin: No visible rashes or ulcers.  Psych:  Mood & affect appropriate    LABS: All Labs Reviewed:                        14.1   9.03  )-----------( 180      ( 22 Nov 2018 07:22 )             44.7                         14.2   7.55  )-----------( 167      ( 21 Nov 2018 15:47 )             44.9     22 Nov 2018 07:22    140    |  103    |  22     ----------------------------<  82     4.4     |  29     |  0.81   21 Nov 2018 15:47    140    |  106    |  23     ----------------------------<  99     5.0     |  27     |  0.77     Ca    9.3        22 Nov 2018 07:22  Ca    8.9        21 Nov 2018 15:47  Mg     2.2       22 Nov 2018 07:22    TPro  7.0    /  Alb  3.6    /  TBili  0.7    /  DBili  x      /  AST  18     /  ALT  18     /  AlkPhos  52     21 Nov 2018 15:47      < from: 12 Lead ECG (11.21.18 @ 23:43) >    Ventricular Rate 58 BPM    Atrial Rate 81 BPM    QRS Duration 116 ms    Q-T Interval 428 ms    QTC Calculation(Bezet) 420 ms    R Axis -57 degrees    T Axis 14 degrees    Diagnosis Line Atrial fibrillation with slow ventricular response  Left axis deviation  Left ventricular hypertrophy with QRS widening  Anteroseptal infarct , age undetermined  Abnormal ECG    Confirmed by margo Álvarez ([id number]) on 11/22/2018 4:19:35 PM    < end of copied text >  < from: TTE Echo Doppler w/o Cont (11.22.18 @ 14:46) >     EXAM:  ECHO TTE WO CON COMP W DOPPLR         PROCEDURE DATE:  11/22/2018        INTERPRETATION:  Ordering Physician: KELBY WADSWORTH [id number]    Indication: Abnormal EKG    Study Quality: Technically difficult   A complete echocardiographic studywas performed utilizing standard   protocol including spectral and color Doppler in all echocardiographic   windows.    Height: 152 cm  Weight: 52 kg  BSA: 1.5 sq m  Blood Pressure: 104/70    MEASUREMENTS  IVS: 1.2cm  PWT: 1.2cm  LA: 5.9cm  LVIDd: 4.2cm  LVIDs: 3.1cm    RVSP: 45mmHg    FINDINGS  Left Ventricle: Mild concentric left ventricular hypertrophy. Grossly   normal LV systolic function. No segmental wall motion abnormalities are   visualized  Aortic Valve: Calcified aortic valve with decreased opening. The peak   aortic valve gradient is equal to 82 mmHg, and the mean aortic valve   gradient is equal to 47 mmHg, consistent with severe aortic stenosis.   Estimated valve area is 0.5 squared centimeters. Mild aortic insufficiency  MitralValve: Mitral annular calcification. Calcified and thickened   mitral valve with decreased opening. Moderate mitral regurgitation  Tricuspid Valve: Moderate to severe tricuspid regurgitation  Pulmonic Valve: Not well-visualized.  Left Atrium: Severe left atrial enlargement  Right Ventricle: Not well-visualized. There appears to be mild right   ventricular enlargement with normal systolic function  Right Atrium: Enlarged  Diastolic Function: Doppler evidence of moderate diastolic dysfunction  Pericardium/Pleura: No pericardial effusion  Hemodynamics: The pulmonary artery systolic pressure is estimated to be   45 mmHg, assuming the right atrial pressure is equal to 8 mmHg,   consistent with mild pulmonary hypertension.    CONCLUSIONS:  1. Technically difficult study  2.  Mild concentric left ventricular hypertrophy. Grossly normal LV   systolic function.   3. Calcified aortic valve with decreased opening. Severe aortic stenosis.   Mild aortic insufficiency  4. Calcified and thickened mitralvalve with decreased opening. Moderate   mitral regurgitation  5. Moderate to severe tricuspid regurgitation  6. Biatrial enlargement  7. Mildly elevated pulmonary pressures  8. No pericardial effusion    No prior exam for comparison.                    MARGO ÁLVAREZ   This document has been electronically signed. Nov 22 2018  3:04PM        < end of copied text >    < from: CT Chest w/ IV Cont (11.21.18 @ 19:02) >  EXAM:  CT CHEST IC                            *** ADDENDUM 11/21/2018  ***    There are 2 nondisplaced posteriorly situated fractures of right ribs,   11th and 12th ribs.      *** END OF ADDENDUM 11/21/2018  ***      PROCEDURE DATE:  11/21/2018          INTERPRETATION:  Clinical information: Right-sided rib pain    CT study of the chest abdomen and pelvis.    Axial images obtained, coronal and sagittal images computer reformatted.    IV contrast material administered.    95 cc of Omnipaque 350 intravenously administered, 5 cc discarded.    No prior studies present for comparison    Chest: Global cardiomegaly present. No evidence of pericardial effusion.   No evidence of thoracic aortic aneurysm. Central airway intact. Thyroid   gland not enlarged. No mediastinal lesions evident. No hilar lesions   evident.    Pleural base consolidation, right upper lobe. Anterior posterior   dimension, 6 cm, 2.5 cm in width, 3 cm vertical height. Cannot exclude a   neoplasm. This lesion abuts the minor fissure. Advise follow-up   evaluation.     No drainable pleural effusion. No pneumothorax. Bullous changes right   lower lobe. Small fissural fluid collection present.   Severe dorsal   kyphosis present. Compression fracture of T10, follow-up with bone scan.              Abdomen-pelvis: Hepatic parenchyma homogeneous. The gallbladder is filled   with stones. The spleen is not enlarged. The pancreas is atrophic.   Atherosclerotic changes, abdominal aorta, no evidence of aneurysm. No   retroperitoneal lymphadenopathy. No biliary ductal dilatation or ascites.   No adrenal lesions.    Large cyst lower pole of left kidney. No hydronephrotic changes.    No bowel obstruction. Diverticulosis present. Urinary bladder is filled,   no stones evident. Noevidence of free pelvic fluid. Inguinal regions   intact. No acute appearing osseous abnormalities. Calcified disc L5-S1.   Possible hemangioma of the bone, L2.        IMPRESSION: See findings described above.                ***Please see the addendum at the top of this report. It may contain   additional important information or changes.****          MALLY BRYANT M.D.,ATTENDING RADIOLOGIST  This document has been electronically signed. Nov 21 2018  7:24PM  Addend:  MALLY BRYANT M.D.,ATTENDING RADIOLOGIST  This addendum was electronically signed on: Nov 21 2018  7:39PM.          < end of copied text >

## 2018-11-24 PROCEDURE — 99232 SBSQ HOSP IP/OBS MODERATE 35: CPT

## 2018-11-24 RX ORDER — MAGNESIUM HYDROXIDE 400 MG/1
30 TABLET, CHEWABLE ORAL DAILY
Qty: 0 | Refills: 0 | Status: DISCONTINUED | OUTPATIENT
Start: 2018-11-24 | End: 2018-11-27

## 2018-11-24 RX ADMIN — LIDOCAINE 1 PATCH: 4 CREAM TOPICAL at 00:00

## 2018-11-24 RX ADMIN — MORPHINE SULFATE 2 MILLIGRAM(S): 50 CAPSULE, EXTENDED RELEASE ORAL at 01:35

## 2018-11-24 RX ADMIN — Medication 100 MILLIGRAM(S): at 11:19

## 2018-11-24 RX ADMIN — MORPHINE SULFATE 2 MILLIGRAM(S): 50 CAPSULE, EXTENDED RELEASE ORAL at 02:00

## 2018-11-24 RX ADMIN — SIMVASTATIN 40 MILLIGRAM(S): 20 TABLET, FILM COATED ORAL at 21:48

## 2018-11-24 RX ADMIN — ATENOLOL 25 MILLIGRAM(S): 25 TABLET ORAL at 05:40

## 2018-11-24 RX ADMIN — Medication 100 MILLIGRAM(S): at 21:48

## 2018-11-24 RX ADMIN — ENOXAPARIN SODIUM 40 MILLIGRAM(S): 100 INJECTION SUBCUTANEOUS at 11:18

## 2018-11-24 RX ADMIN — LIDOCAINE 1 PATCH: 4 CREAM TOPICAL at 11:21

## 2018-11-24 RX ADMIN — Medication 1 APPLICATION(S): at 05:40

## 2018-11-24 RX ADMIN — Medication 1 APPLICATION(S): at 14:46

## 2018-11-24 RX ADMIN — CLOPIDOGREL BISULFATE 75 MILLIGRAM(S): 75 TABLET, FILM COATED ORAL at 11:18

## 2018-11-24 RX ADMIN — POLYETHYLENE GLYCOL 3350 17 GRAM(S): 17 POWDER, FOR SOLUTION ORAL at 11:19

## 2018-11-24 RX ADMIN — SENNA PLUS 2 TABLET(S): 8.6 TABLET ORAL at 21:48

## 2018-11-24 RX ADMIN — LISINOPRIL 10 MILLIGRAM(S): 2.5 TABLET ORAL at 05:40

## 2018-11-24 RX ADMIN — MAGNESIUM HYDROXIDE 30 MILLILITER(S): 400 TABLET, CHEWABLE ORAL at 21:54

## 2018-11-24 RX ADMIN — BUDESONIDE AND FORMOTEROL FUMARATE DIHYDRATE 2 PUFF(S): 160; 4.5 AEROSOL RESPIRATORY (INHALATION) at 21:48

## 2018-11-24 NOTE — PROGRESS NOTE ADULT - SUBJECTIVE AND OBJECTIVE BOX
[facility] -- Shauna Raphael, Enoc Garsia, Ricardo Higgins Savella  Office # [phone]    Follow Up:  Afib    Subjective/Observations: Pt. seen and examined and evaluated. Pt. resting comfortably in bed in NAD, with no respiratory distress, no chest pain, dyspnea, palpitations, PND, or orthopnea.        REVIEW OF SYSTEMS: All other review of systems is negative unless indicated above    PAST MEDICAL & SURGICAL HISTORY:  Stented coronary artery  HTN (hypertension)      MEDICATIONS  (STANDING):  ATENolol  Tablet 25 milliGRAM(s) Oral daily  buDESOnide  80 MICROgram(s)/formoterol 4.5 MICROgram(s) Inhaler 2 Puff(s) Inhalation two times a day  clopidogrel Tablet 75 milliGRAM(s) Oral daily  docusate sodium 100 milliGRAM(s) Oral three times a day  enoxaparin Injectable 40 milliGRAM(s) SubCutaneous daily  hydrocortisone 1% Cream 1 Application(s) Topical two times a day  lidocaine   Patch 1 Patch Transdermal daily  lisinopril 10 milliGRAM(s) Oral daily  polyethylene glycol 3350 17 Gram(s) Oral daily  senna 2 Tablet(s) Oral at bedtime  simvastatin 40 milliGRAM(s) Oral at bedtime    MEDICATIONS  (PRN):  acetaminophen   Tablet .. 650 milliGRAM(s) Oral every 6 hours PRN Temp greater or equal to 38C (100.4F), Mild Pain (1 - 3)  morphine  - Injectable 2 milliGRAM(s) IV Push every 4 hours PRN Severe Pain (7 - 10)  ondansetron Injectable 4 milliGRAM(s) IV Push every 6 hours PRN Nausea and/or Vomiting  oxyCODONE    5 mG/acetaminophen 325 mG 1 Tablet(s) Oral every 6 hours PRN Moderate Pain (4 - 6)      Allergies: No Known Allergies      Vital Signs Last 24 Hrs  T(C): 36.4 (24 Nov 2018 04:25), Max: 37.2 (23 Nov 2018 23:35)  T(F): 97.6 (24 Nov 2018 04:25), Max: 99 (23 Nov 2018 23:35)  HR: 77 (24 Nov 2018 04:25) (57 - 96)  BP: 115/78 (24 Nov 2018 04:25) (101/63 - 144/75)  BP(mean): --  RR: 16 (24 Nov 2018 04:25) (16 - 18)  SpO2: 94% (24 Nov 2018 04:25) (94% - 98%)    I&O's Summary    22 Nov 2018 07:01  -  23 Nov 2018 07:00  --------------------------------------------------------  IN: 120 mL / OUT: 0 mL / NET: 120 mL          PHYSICAL EXAM:  TELE:   Constitutional: NAD, awake and alert, well-developed  HEENT: Moist Mucous Membranes, Anicteric  Pulmonary: Non-labored, breath sounds are clear bilaterally, No wheezing, rales or rhonchi  Cardiovascular: Regular, S1 and S2, No murmurs, rubs, gallops or clicks  Gastrointestinal: Bowel Sounds present, soft, nontender.   Lymph: No peripheral edema. No lymphadenopathy.  Skin: No visible rashes or ulcers.  Psych:  Mood & affect appropriate    LABS: All Labs Reviewed:                        ECHO:      RADIOLOGY:      Ailyn Beebe DNP, ANP-c   Cardiology [facility] -- Shauna Raphael, Enoc Garsia, Ricardo Higgins Savella  Office # [phone]    Follow Up:  Afib    Subjective/Observations: Pt. seen and examined and evaluated. Pt. resting comfortably in bed in NAD, with no respiratory distress, no chest pain, dyspnea, palpitations, PND, or orthopnea.        REVIEW OF SYSTEMS: All other review of systems is negative unless indicated above    PAST MEDICAL & SURGICAL HISTORY:  Stented coronary artery  HTN (hypertension)      MEDICATIONS  (STANDING):  ATENolol  Tablet 25 milliGRAM(s) Oral daily  buDESOnide  80 MICROgram(s)/formoterol 4.5 MICROgram(s) Inhaler 2 Puff(s) Inhalation two times a day  clopidogrel Tablet 75 milliGRAM(s) Oral daily  docusate sodium 100 milliGRAM(s) Oral three times a day  enoxaparin Injectable 40 milliGRAM(s) SubCutaneous daily  hydrocortisone 1% Cream 1 Application(s) Topical two times a day  lidocaine   Patch 1 Patch Transdermal daily  lisinopril 10 milliGRAM(s) Oral daily  polyethylene glycol 3350 17 Gram(s) Oral daily  senna 2 Tablet(s) Oral at bedtime  simvastatin 40 milliGRAM(s) Oral at bedtime    MEDICATIONS  (PRN):  acetaminophen   Tablet .. 650 milliGRAM(s) Oral every 6 hours PRN Temp greater or equal to 38C (100.4F), Mild Pain (1 - 3)  morphine  - Injectable 2 milliGRAM(s) IV Push every 4 hours PRN Severe Pain (7 - 10)  ondansetron Injectable 4 milliGRAM(s) IV Push every 6 hours PRN Nausea and/or Vomiting  oxyCODONE    5 mG/acetaminophen 325 mG 1 Tablet(s) Oral every 6 hours PRN Moderate Pain (4 - 6)      Allergies: No Known Allergies      Vital Signs Last 24 Hrs  T(C): 36.4 (24 Nov 2018 04:25), Max: 37.2 (23 Nov 2018 23:35)  T(F): 97.6 (24 Nov 2018 04:25), Max: 99 (23 Nov 2018 23:35)  HR: 77 (24 Nov 2018 04:25) (57 - 96)  BP: 115/78 (24 Nov 2018 04:25) (101/63 - 144/75)  BP(mean): --  RR: 16 (24 Nov 2018 04:25) (16 - 18)  SpO2: 94% (24 Nov 2018 04:25) (94% - 98%)    I&O's Summary    22 Nov 2018 07:01  -  23 Nov 2018 07:00  --------------------------------------------------------  IN: 120 mL / OUT: 0 mL / NET: 120 mL          PHYSICAL EXAM:  TELE: Overnight on telemetry afib 70's   Constitutional: NAD, awake and alert, well-developed  HEENT: Moist Mucous Membranes, Anicteric  Pulmonary: Non-labored, breath sounds are clear bilaterally, No wheezing, rales or rhonchi  Cardiovascular: Regular, S1 and S2, No murmurs, rubs, gallops or clicks  Gastrointestinal: Bowel Sounds present, soft, nontender.   Lymph: No peripheral edema. No lymphadenopathy.  Skin: Rash noted on back   Psych:  Mood & affect appropriate      ECHO:  < from: TTE Echo Doppler w/o Cont (11.22.18 @ 14:46) >  FINDINGS  Left Ventricle: Mild concentric left ventricular hypertrophy. Grossly   normal LV systolic function. No segmental wall motion abnormalities are   visualized  Aortic Valve: Calcified aortic valve with decreased opening. The peak   aortic valve gradient is equal to 82 mmHg, and the mean aortic valve   gradient is equal to 47 mmHg, consistent with severe aortic stenosis.   Estimated valve area is 0.5 squared centimeters. Mild aortic insufficiency  MitralValve: Mitral annular calcification. Calcified and thickened   mitral valve with decreased opening. Moderate mitral regurgitation  Tricuspid Valve: Moderate to severe tricuspid regurgitation  Pulmonic Valve: Not well-visualized.  Left Atrium: Severe left atrial enlargement  Right Ventricle: Not well-visualized. There appears to be mild right   ventricular enlargement with normal systolic function  Right Atrium: Enlarged  Diastolic Function: Doppler evidence of moderate diastolic dysfunction  Pericardium/Pleura: No pericardial effusion  Hemodynamics: The pulmonary artery systolic pressure is estimated to be   45 mmHg, assuming the right atrial pressure is equal to 8 mmHg,   consistent with mild pulmonary hypertension.    CONCLUSIONS:  1. Technically difficult study  2.  Mild concentric left ventricular hypertrophy. Grossly normal LV   systolic function.   3. Calcified aortic valve with decreased opening. Severe aortic stenosis.   Mild aortic insufficiency  4. Calcified and thickened mitralvalve with decreased opening. Moderate   mitral regurgitation  5. Moderate to severe tricuspid regurgitation  6. Biatrial enlargement  7. Mildly elevated pulmonary pressures  8. No pericardial effusion      Aliyn Beebe DNP, ANP-c   Cardiology

## 2018-11-24 NOTE — PROGRESS NOTE ADULT - ASSESSMENT
Pt admitted after fall with rib fractures. Found to have RUL mass. Possible cancer.  Due to pt's advanced age, would do limited workup and treatment.  Should be stable for dc soon  Has severe AS

## 2018-11-24 NOTE — PROGRESS NOTE ADULT - SUBJECTIVE AND OBJECTIVE BOX
Patient is a 89y old  Female who presents with a chief complaint of fall (24 Nov 2018 06:44)      INTERVAL HPI/OVERNIGHT EVENTS: Patient seen and examined. NAD. No complaints.    Vital Signs Last 24 Hrs  T(C): 36.4 (24 Nov 2018 11:53), Max: 37.2 (23 Nov 2018 23:35)  T(F): 97.6 (24 Nov 2018 11:53), Max: 99 (23 Nov 2018 23:35)  HR: 63 (24 Nov 2018 11:53) (57 - 87)  BP: 119/51 (24 Nov 2018 11:53) (104/63 - 144/75)  BP(mean): --  RR: 16 (24 Nov 2018 11:53) (16 - 17)  SpO2: 99% (24 Nov 2018 11:53) (94% - 99%)              CAPILLARY BLOOD GLUCOSE                  acetaminophen   Tablet .. 650 milliGRAM(s) Oral every 6 hours PRN  ATENolol  Tablet 25 milliGRAM(s) Oral daily  buDESOnide  80 MICROgram(s)/formoterol 4.5 MICROgram(s) Inhaler 2 Puff(s) Inhalation two times a day  clopidogrel Tablet 75 milliGRAM(s) Oral daily  docusate sodium 100 milliGRAM(s) Oral three times a day  enoxaparin Injectable 40 milliGRAM(s) SubCutaneous daily  hydrocortisone 1% Cream 1 Application(s) Topical two times a day  lidocaine   Patch 1 Patch Transdermal daily  lisinopril 10 milliGRAM(s) Oral daily  morphine  - Injectable 2 milliGRAM(s) IV Push every 4 hours PRN  ondansetron Injectable 4 milliGRAM(s) IV Push every 6 hours PRN  oxyCODONE    5 mG/acetaminophen 325 mG 1 Tablet(s) Oral every 6 hours PRN  polyethylene glycol 3350 17 Gram(s) Oral daily  senna 2 Tablet(s) Oral at bedtime  simvastatin 40 milliGRAM(s) Oral at bedtime              REVIEW OF SYSTEMS:  CONSTITUTIONAL: No fever, no weight loss, or no fatigue  NECK: No pain, no stiffness  RESPIRATORY: No cough, no wheezing, no chills, no hemoptysis, No shortness of breath  CARDIOVASCULAR: No chest pain, no palpitations, no dizziness, no leg swelling  GASTROINTESTINAL: No abdominal pain. No nausea, no vomiting, no hematemesis; No diarrhea, no constipation. No melena, no hematochezia.  GENITOURINARY: No dysuria, no frequency, no hematuria, no incontinence  NEUROLOGICAL: No headaches, no loss of strength, no numbness, no tremors  SKIN: No itching, no burning  MUSCULOSKELETAL: No joint pain, no swelling; No muscle, no back, no extremity pain  PSYCHIATRIC: No depression, no mood swings,   HEME/LYMPH: No easy bruising, no bleeding gums  ALLERY AND IMMUNOLOGIC: No hives       Consultant(s) Notes Reviewed:  [X] YES  [ ] NO    PHYSICAL EXAM:  GENERAL: NAD  HEAD:  Atraumatic, Normocephalic  EYES: EOMI, PERRLA, conjunctiva and sclera clear  ENMT: No tonsillar erythema, exudates, or enlargement; Moist mucous membranes  NECK: Supple, No JVD  NERVOUS SYSTEM:  Awake & alert  CHEST/LUNG: Clear to auscultation bilaterally; No rales, rhonchi, wheezing,  HEART: Regular rate and rhythm  ABDOMEN: Soft, Nontender, Nondistended; Bowel sounds present  EXTREMITIES:  No clubbing, cyanosis, or edema  LYMPH: No lymphadenopathy noted  SKIN: No rashes      Advanced care planning discussed with patient/family [X] YES   [ ] NO    Advanced care planning discussed with patient/family. Advanced care planning forms reviewed/discussed/completed. 20 minutes spent.

## 2018-11-24 NOTE — PROGRESS NOTE ADULT - ASSESSMENT
Mrs. Barnes is a 89 year old with reported CAD on Plavix, here with a fall. Found to have Lung mass and rib fractures  Found to be in Atrial fibrillation with slow ventricular response on EKG. Unclear if there is an AF history.   The etiology of her fall is unclear, and syncope is a possibility    - Cont telemetry  - No sign of acute ischemia.  - Slow AF on telemetry, monitor for bradycardia and pauses.  Rate to 47 to 70s.  No pauses noted.    - Continue atenolol to 25 mg po daily  - BP labile would check orthostatics if able  - Would be hesitant to start a/c given her propensity for falls and advanced age  - Continue Plavix 75 mg po daily  - Continue statin  - Echocardiogram with grossly normal LV systolic function, mild LVH, severe AS, mild AI, mod MR, mod-severe TR, Biatrial Enlargement, mildly elevated pulmonary pressures.    - Watch creatinine and electrolytes.  Maintain K > 4 and Mag >2.  Replete prn.    - Will follow with you.  - D/C planning to [facility]  Cardiology Mrs. Barnes is a 89 year old with reported CAD on Plavix, here with a fall. Found to have Lung mass and rib fractures  Found to be in Atrial fibrillation with slow ventricular response on EKG. Unclear if there is an AF history.   The etiology of her fall is unclear, and syncope is a possibility    - Continue  telemetry  - No sign of acute ischemia.  - Slow AF on telemetry, monitor for bradycardia and pauses.  Rate to 47 to 70s.  No pauses noted.    - Continue atenolol to 25 mg po daily -144 DBP 63-81  - Pt. is not a candidate to start a/c given her propensity for falls and advanced age  - Continue Plavix 75 mg po daily  - Continue statin  - Echocardiogram with grossly normal LV systolic function, mild LVH, severe AS, mild AI, mod MR, mod-severe TR, Biatrial Enlargement, mildly elevated pulmonary pressures.    - Watch creatinine and electrolytes.  Maintain K > 4 and Mag >2.  Replete prn.    - Will continue to follow with you.  - D/C planning to [facility]  Cardiology Mrs. Barnes is a 89 year old with reported CAD on Plavix, here with a fall. Found to have Lung mass and rib fractures  Found to be in Atrial fibrillation with slow ventricular response on EKG. Unclear if there is an AF history.   The etiology of her fall is unclear, and syncope is a possibility    - Continue  telemetry for now  - No sign of acute ischemia.  - Slow AF on telemetry has essentially resolved with reducing atenolol.  No pauses noted.    - Continue atenolol to 25 mg po daily -144 DBP 63-81  - Pt. is not a candidate to start a/c given her propensity for falls and advanced age  - Continue Plavix 75 mg po daily  - Continue statin  - Echocardiogram with grossly normal LV systolic function, mild LVH, severe AS, mild AI, mod MR, mod-severe TR, Biatrial Enlargement, mildly elevated pulmonary pressures.    - Watch creatinine and electrolytes.  Maintain K > 4 and Mag >2.  Replete prn.    - Will continue to follow with you.  - D/C planning to [facility]

## 2018-11-24 NOTE — PROGRESS NOTE ADULT - SUBJECTIVE AND OBJECTIVE BOX
PULMONARY/CRITICAL CARE      INTERVAL HPI/OVERNIGHT EVENTS:  Confused. No sob, pain.  89y FemaleHPI:  89 y female presents accompanied to ED by son and daughter in law, son states he was assisting patient in the bathroom today,  she was sitting on the commode, the house phone rang,  he went to answer it,  he heard his mother call him, found her sitting on the bathroom floor, back against the tub.  awake , alert, patient states she has right rib pain, and abdominal pain,  no nausea, no vomiting, states she did not hit her head, no neck pain, patient is on plavix, hx of stents.     Patient lives alone but family has been taking turns staying at her place taking care of her since her fall in early October. She did not come to the hospital for evaluation at that time. Family reports that they cannot take care of her any longer. She requires a lot of help with her ADLs. They have applied for Medicaid. They were told that the Medicaid aide would be in place in a few weeks. Their goal is to eventually move her back to her apartment with the 24-hour Medicaid aide. (22 Nov 2018 11:48)        PAST MEDICAL & SURGICAL HISTORY:  Stented coronary artery  HTN (hypertension)        ICU Vital Signs Last 24 Hrs  T(C): 36.4 (24 Nov 2018 11:53), Max: 37.2 (23 Nov 2018 23:35)  T(F): 97.6 (24 Nov 2018 11:53), Max: 99 (23 Nov 2018 23:35)  HR: 63 (24 Nov 2018 11:53) (57 - 87)  BP: 119/51 (24 Nov 2018 11:53) (104/63 - 144/75)  BP(mean): --  ABP: --  ABP(mean): --  RR: 16 (24 Nov 2018 11:53) (16 - 17)  SpO2: 99% (24 Nov 2018 11:53) (94% - 99%)    Qtts:     I&O's Summary        REVIEW OF SYSTEMS:    CONSTITUTIONAL: No fever, weight loss, or fatigue  RESPIRATORY: No cough, wheezing, chills or hemoptysis; No shortness of breath  CARDIOVASCULAR: No chest pain, palpitations, dizziness, or leg swelling  GASTROINTESTINAL: No abdominal or epigastric pain. No nausea, vomiting, or hematemesis; No diarrhea or constipation. No melena or hematochezia.  GENITOURINARY: No dysuria, frequency, hematuria, or incontinence  NEUROLOGICAL: No headaches, some memory loss, loss of strength, numbness, or tremors  SKIN: No itching, burning, rashes, or lesions   LYMPH NODES: No enlarged glands  ENDOCRINE: No heat or cold intolerance; No hair loss  MUSCULOSKELETAL: No joint pain or swelling; No muscle, back, or extremity pain, no calf tenderness    PHYSICAL EXAM:    GENERAL: NAD, well-groomed, elderly,   HEAD:  Atraumatic, Normocephalic  EYES: EOMI, PERRLA, conjunctiva and sclera clear  ENMT: No tonsillar erythema, exudates, or enlargement; Moist mucous membranes, Good dentition, No lesions  NECK: Supple, No JVD, Normal thyroid  NERVOUS SYSTEM:  Alert ,  confused. Moves all extremities  CHEST/LUNG: Clear to percussion bilaterally; No rales, rhonchi, wheezing, or rubs Decreased bs  HEART: Regular rate and rhythm; No murmurs, rubs, or gallops  ABDOMEN: Soft, Nontender, Nondistended; Bowel sounds present  EXTREMITIES:  2+ Peripheral Pulses, No clubbing, cyanosis, or edema  LYMPH: No lymphadenopathy noted  SKIN: No rashes or lesions        LABS:                vanco through     RADIOLOGY & ADDITIONAL STUDIES:  ECHO AS severe      CRITICAL CARE TIME SPENT:

## 2018-11-25 PROCEDURE — 99232 SBSQ HOSP IP/OBS MODERATE 35: CPT

## 2018-11-25 RX ADMIN — MORPHINE SULFATE 2 MILLIGRAM(S): 50 CAPSULE, EXTENDED RELEASE ORAL at 23:42

## 2018-11-25 RX ADMIN — Medication 1 APPLICATION(S): at 17:14

## 2018-11-25 RX ADMIN — MORPHINE SULFATE 2 MILLIGRAM(S): 50 CAPSULE, EXTENDED RELEASE ORAL at 23:32

## 2018-11-25 RX ADMIN — BUDESONIDE AND FORMOTEROL FUMARATE DIHYDRATE 2 PUFF(S): 160; 4.5 AEROSOL RESPIRATORY (INHALATION) at 21:11

## 2018-11-25 RX ADMIN — Medication 100 MILLIGRAM(S): at 05:32

## 2018-11-25 RX ADMIN — SENNA PLUS 2 TABLET(S): 8.6 TABLET ORAL at 21:11

## 2018-11-25 RX ADMIN — SIMVASTATIN 40 MILLIGRAM(S): 20 TABLET, FILM COATED ORAL at 21:11

## 2018-11-25 RX ADMIN — BUDESONIDE AND FORMOTEROL FUMARATE DIHYDRATE 2 PUFF(S): 160; 4.5 AEROSOL RESPIRATORY (INHALATION) at 05:47

## 2018-11-25 RX ADMIN — OXYCODONE AND ACETAMINOPHEN 1 TABLET(S): 5; 325 TABLET ORAL at 21:16

## 2018-11-25 RX ADMIN — Medication 100 MILLIGRAM(S): at 13:04

## 2018-11-25 RX ADMIN — ENOXAPARIN SODIUM 40 MILLIGRAM(S): 100 INJECTION SUBCUTANEOUS at 11:39

## 2018-11-25 RX ADMIN — CLOPIDOGREL BISULFATE 75 MILLIGRAM(S): 75 TABLET, FILM COATED ORAL at 11:35

## 2018-11-25 RX ADMIN — Medication 100 MILLIGRAM(S): at 21:11

## 2018-11-25 RX ADMIN — Medication 1 APPLICATION(S): at 05:32

## 2018-11-25 RX ADMIN — POLYETHYLENE GLYCOL 3350 17 GRAM(S): 17 POWDER, FOR SOLUTION ORAL at 11:34

## 2018-11-25 RX ADMIN — LIDOCAINE 1 PATCH: 4 CREAM TOPICAL at 11:35

## 2018-11-25 RX ADMIN — LISINOPRIL 10 MILLIGRAM(S): 2.5 TABLET ORAL at 05:32

## 2018-11-25 RX ADMIN — ATENOLOL 25 MILLIGRAM(S): 25 TABLET ORAL at 05:32

## 2018-11-25 NOTE — PROGRESS NOTE ADULT - SUBJECTIVE AND OBJECTIVE BOX
[facility] - Shauna Raphael, Sun, Enoc, Ronaldo, Jose Juan Silva  Office Number:  [phone]    Patient resting comfortably in bed in NAD.  Laying flat with no respiratory distress.  No overnight events.    F/U for:  AF    Telemetry:  Rate controlled atrial fibrillation    MEDICATIONS  (STANDING):  ATENolol  Tablet 25 milliGRAM(s) Oral daily  buDESOnide  80 MICROgram(s)/formoterol 4.5 MICROgram(s) Inhaler 2 Puff(s) Inhalation two times a day  clopidogrel Tablet 75 milliGRAM(s) Oral daily  docusate sodium 100 milliGRAM(s) Oral three times a day  enoxaparin Injectable 40 milliGRAM(s) SubCutaneous daily  hydrocortisone 1% Cream 1 Application(s) Topical two times a day  lidocaine   Patch 1 Patch Transdermal daily  lisinopril 10 milliGRAM(s) Oral daily  polyethylene glycol 3350 17 Gram(s) Oral daily  senna 2 Tablet(s) Oral at bedtime  simvastatin 40 milliGRAM(s) Oral at bedtime    MEDICATIONS  (PRN):  acetaminophen   Tablet .. 650 milliGRAM(s) Oral every 6 hours PRN Temp greater or equal to 38C (100.4F), Mild Pain (1 - 3)  magnesium hydroxide Suspension 30 milliLiter(s) Oral daily PRN Constipation  morphine  - Injectable 2 milliGRAM(s) IV Push every 4 hours PRN Severe Pain (7 - 10)  ondansetron Injectable 4 milliGRAM(s) IV Push every 6 hours PRN Nausea and/or Vomiting  oxyCODONE    5 mG/acetaminophen 325 mG 1 Tablet(s) Oral every 6 hours PRN Moderate Pain (4 - 6)      Allergies    No Known Allergies    Intolerances        Vital Signs Last 24 Hrs  T(C): 36.5 (25 Nov 2018 07:11), Max: 37.1 (24 Nov 2018 15:24)  T(F): 97.7 (25 Nov 2018 07:11), Max: 98.7 (24 Nov 2018 15:24)  HR: 82 (25 Nov 2018 07:11) (59 - 82)  BP: 110/71 (25 Nov 2018 07:11) (106/62 - 155/71)  BP(mean): --  RR: 16 (25 Nov 2018 07:11) (16 - 16)  SpO2: 97% (25 Nov 2018 07:11) (92% - 99%)    I&O's Summary      ON EXAM:    General: NAD, awake and alert  HEENT: Mucous membranes are moist, anicteric  Lungs: Non-labored, breath sounds are clear bilaterally, No wheezing, rales or rhonchi  Cardiovascular: Irregular, S1 and S2  Gastrointestinal: Bowel Sounds present, soft, nontender.   Lymph: No peripheral edema. No lymphadenopathy.  Skin: No rashes or ulcers  Psych:  Mood & affect appropriate    LABS: All Labs Reviewed:                Blood Culture:

## 2018-11-25 NOTE — PROGRESS NOTE ADULT - ASSESSMENT
Mrs. Barnes is a 89 year old with reported CAD on Plavix, here with a fall. Found to have Lung mass and rib fractures  Found to be in Atrial fibrillation with slow ventricular response on EKG. Unclear if there is an AF history.   The etiology of her fall is unclear, and syncope is a possibility    - No events on telemetry.  can d/c telemetry  - No sign of acute ischemia.  - Slow AF on telemetry has essentially resolved with reducing atenolol.  No pauses noted.    - Continue atenolol to 25 mg po daily   - Pt. is not a candidate to start a/c given her propensity for falls and advanced age  - Continue Plavix 75 mg po daily  - Continue statin  - Echocardiogram with grossly normal LV systolic function, mild LVH, severe AS, mild AI, mod MR, mod-severe TR, Biatrial Enlargement, mildly elevated pulmonary pressures.    - Watch creatinine and electrolytes.  Maintain K > 4 and Mag >2.  Replete prn.    - Will continue to follow with you.  - D/C planning to [facility]

## 2018-11-25 NOTE — PROGRESS NOTE ADULT - SUBJECTIVE AND OBJECTIVE BOX
Patient is a 89y old  Female who presents with a chief complaint of fall (25 Nov 2018 10:35)      INTERVAL HPI/OVERNIGHT EVENTS: Patient seen and examined. NAD. No complaints.    Vital Signs Last 24 Hrs  T(C): 36.5 (25 Nov 2018 07:11), Max: 37.1 (24 Nov 2018 15:24)  T(F): 97.7 (25 Nov 2018 07:11), Max: 98.7 (24 Nov 2018 15:24)  HR: 82 (25 Nov 2018 07:11) (59 - 82)  BP: 110/71 (25 Nov 2018 07:11) (106/62 - 155/71)  BP(mean): --  RR: 16 (25 Nov 2018 07:11) (16 - 16)  SpO2: 97% (25 Nov 2018 07:11) (92% - 97%)              CAPILLARY BLOOD GLUCOSE                  acetaminophen   Tablet .. 650 milliGRAM(s) Oral every 6 hours PRN  ATENolol  Tablet 25 milliGRAM(s) Oral daily  buDESOnide  80 MICROgram(s)/formoterol 4.5 MICROgram(s) Inhaler 2 Puff(s) Inhalation two times a day  clopidogrel Tablet 75 milliGRAM(s) Oral daily  docusate sodium 100 milliGRAM(s) Oral three times a day  enoxaparin Injectable 40 milliGRAM(s) SubCutaneous daily  hydrocortisone 1% Cream 1 Application(s) Topical two times a day  lidocaine   Patch 1 Patch Transdermal daily  lisinopril 10 milliGRAM(s) Oral daily  magnesium hydroxide Suspension 30 milliLiter(s) Oral daily PRN  morphine  - Injectable 2 milliGRAM(s) IV Push every 4 hours PRN  ondansetron Injectable 4 milliGRAM(s) IV Push every 6 hours PRN  oxyCODONE    5 mG/acetaminophen 325 mG 1 Tablet(s) Oral every 6 hours PRN  polyethylene glycol 3350 17 Gram(s) Oral daily  senna 2 Tablet(s) Oral at bedtime  simvastatin 40 milliGRAM(s) Oral at bedtime              REVIEW OF SYSTEMS:  CONSTITUTIONAL: No fever, no weight loss, or no fatigue  NECK: No pain, no stiffness  RESPIRATORY: No cough, no wheezing, no chills, no hemoptysis, No shortness of breath  CARDIOVASCULAR: No chest pain, no palpitations, no dizziness, no leg swelling  GASTROINTESTINAL: No abdominal pain. No nausea, no vomiting, no hematemesis; No diarrhea, no constipation. No melena, no hematochezia.  GENITOURINARY: No dysuria, no frequency, no hematuria, no incontinence  NEUROLOGICAL: No headaches, no loss of strength, no numbness, no tremors  SKIN: No itching, no burning  MUSCULOSKELETAL: No joint pain, no swelling; No muscle, no back, no extremity pain  PSYCHIATRIC: No depression, no mood swings,   HEME/LYMPH: No easy bruising, no bleeding gums  ALLERY AND IMMUNOLOGIC: No hives       Consultant(s) Notes Reviewed:  [X] YES  [ ] NO    PHYSICAL EXAM:  GENERAL: NAD  HEAD:  Atraumatic, Normocephalic  EYES: EOMI, PERRLA, conjunctiva and sclera clear  ENMT: No tonsillar erythema, exudates, or enlargement; Moist mucous membranes  NECK: Supple, No JVD  NERVOUS SYSTEM:  Awake & alert  CHEST/LUNG: Clear to auscultation bilaterally; No rales, rhonchi, wheezing,  HEART: Regular rate and rhythm  ABDOMEN: Soft, Nontender, Nondistended; Bowel sounds present  EXTREMITIES:  No clubbing, cyanosis, or edema  LYMPH: No lymphadenopathy noted  SKIN: No rashes      Advanced care planning discussed with patient/family [X] YES   [ ] NO    Advanced care planning discussed with patient/family. Advanced care planning forms reviewed/discussed/completed. 20 minutes spent.

## 2018-11-25 NOTE — PROGRESS NOTE ADULT - SUBJECTIVE AND OBJECTIVE BOX
PULMONARY/CRITICAL CARE      INTERVAL HPI/OVERNIGHT EVENTS: Pt. unchanged, confused. No pain, sob    89y FemaleHPI:  89 y female presents accompanied to ED by son and daughter in law, son states he was assisting patient in the bathroom today,  she was sitting on the commode, the house phone rang,  he went to answer it,  he heard his mother call him, found her sitting on the bathroom floor, back against the tub.  awake , alert, patient states she has right rib pain, and abdominal pain,  no nausea, no vomiting, states she did not hit her head, no neck pain, patient is on plavix, hx of stents.     Patient lives alone but family has been taking turns staying at her place taking care of her since her fall in early October. She did not come to the hospital for evaluation at that time. Family reports that they cannot take care of her any longer. She requires a lot of help with her ADLs. They have applied for Medicaid. They were told that the Medicaid aide would be in place in a few weeks. Their goal is to eventually move her back to her apartment with the 24-hour Medicaid aide. (22 Nov 2018 11:48)        PAST MEDICAL & SURGICAL HISTORY:  Stented coronary artery  HTN (hypertension)        ICU Vital Signs Last 24 Hrs  T(C): 36.5 (25 Nov 2018 07:11), Max: 37.1 (24 Nov 2018 15:24)  T(F): 97.7 (25 Nov 2018 07:11), Max: 98.7 (24 Nov 2018 15:24)  HR: 82 (25 Nov 2018 07:11) (59 - 82)  BP: 110/71 (25 Nov 2018 07:11) (106/62 - 155/71)  BP(mean): --  ABP: --  ABP(mean): --  RR: 16 (25 Nov 2018 07:11) (16 - 16)  SpO2: 97% (25 Nov 2018 07:11) (92% - 99%)    Qtts:     I&O's Summary      REVIEW OF SYSTEMS:    CONSTITUTIONAL: No fever, weight loss, or fatigue  RESPIRATORY: No cough, wheezing, chills or hemoptysis; No shortness of breath  CARDIOVASCULAR: No chest pain, palpitations, dizziness, or leg swelling  GASTROINTESTINAL: No abdominal or epigastric pain. No nausea, vomiting, or hematemesis; No diarrhea or constipation. No melena or hematochezia.  GENITOURINARY: No dysuria, frequency, hematuria, or incontinence  NEUROLOGICAL: No headaches, some memory loss, loss of strength, numbness, or tremors  MUSCULOSKELETAL: No joint pain or swelling; No muscle, back, or extremity pain, no calf tenderness    PHYSICAL EXAM:    GENERAL: NAD, well-groomed, elderly,   HEAD:  Atraumatic, Normocephalic  EYES: EOMI, PERRLA, conjunctiva and sclera clear  ENMT: No tonsillar erythema, exudates, or enlargement; Moist mucous membranes, Good dentition, No lesions  NECK: Supple, No JVD, Normal thyroid  NERVOUS SYSTEM:  Alert ,  confused. Moves all extremities  CHEST/LUNG: Clear to percussion bilaterally; No rales, rhonchi, wheezing, or rubs Decreased bs  HEART: Regular rate and rhythm; No murmurs, rubs, or gallops  ABDOMEN: Soft, Nontender, Nondistended; Bowel sounds present  EXTREMITIES:  2+ Peripheral Pulses, No clubbing, cyanosis, or edema  LYMPH: No lymphadenopathy noted  SKIN: No rashes or lesions      LABS:                vanco through     RADIOLOGY & ADDITIONAL STUDIES:      CRITICAL CARE TIME SPENT:

## 2018-11-26 PROCEDURE — 99232 SBSQ HOSP IP/OBS MODERATE 35: CPT

## 2018-11-26 RX ADMIN — Medication 1 APPLICATION(S): at 06:16

## 2018-11-26 RX ADMIN — LIDOCAINE 1 PATCH: 4 CREAM TOPICAL at 12:13

## 2018-11-26 RX ADMIN — OXYCODONE AND ACETAMINOPHEN 1 TABLET(S): 5; 325 TABLET ORAL at 00:42

## 2018-11-26 RX ADMIN — SENNA PLUS 2 TABLET(S): 8.6 TABLET ORAL at 21:00

## 2018-11-26 RX ADMIN — ENOXAPARIN SODIUM 40 MILLIGRAM(S): 100 INJECTION SUBCUTANEOUS at 12:09

## 2018-11-26 RX ADMIN — LIDOCAINE 1 PATCH: 4 CREAM TOPICAL at 00:41

## 2018-11-26 RX ADMIN — CLOPIDOGREL BISULFATE 75 MILLIGRAM(S): 75 TABLET, FILM COATED ORAL at 12:09

## 2018-11-26 RX ADMIN — BUDESONIDE AND FORMOTEROL FUMARATE DIHYDRATE 2 PUFF(S): 160; 4.5 AEROSOL RESPIRATORY (INHALATION) at 20:59

## 2018-11-26 RX ADMIN — BUDESONIDE AND FORMOTEROL FUMARATE DIHYDRATE 2 PUFF(S): 160; 4.5 AEROSOL RESPIRATORY (INHALATION) at 06:44

## 2018-11-26 RX ADMIN — LISINOPRIL 10 MILLIGRAM(S): 2.5 TABLET ORAL at 06:45

## 2018-11-26 RX ADMIN — Medication 1 APPLICATION(S): at 17:20

## 2018-11-26 RX ADMIN — Medication 100 MILLIGRAM(S): at 12:09

## 2018-11-26 RX ADMIN — Medication 100 MILLIGRAM(S): at 06:45

## 2018-11-26 RX ADMIN — OXYCODONE AND ACETAMINOPHEN 1 TABLET(S): 5; 325 TABLET ORAL at 21:09

## 2018-11-26 RX ADMIN — Medication 100 MILLIGRAM(S): at 21:00

## 2018-11-26 RX ADMIN — ATENOLOL 25 MILLIGRAM(S): 25 TABLET ORAL at 06:45

## 2018-11-26 RX ADMIN — SIMVASTATIN 40 MILLIGRAM(S): 20 TABLET, FILM COATED ORAL at 21:00

## 2018-11-26 RX ADMIN — POLYETHYLENE GLYCOL 3350 17 GRAM(S): 17 POWDER, FOR SOLUTION ORAL at 12:12

## 2018-11-26 NOTE — PROGRESS NOTE ADULT - ASSESSMENT
Mrs. Barnes is a 89 year old with reported CAD on Plavix, here with a fall. Found to have Lung mass and rib fractures  Found to be in Atrial fibrillation with slow ventricular response on EKG now resolved s/p atenolol dose decrease. Unclear if there is an AF history.   The etiology of her fall is unclear, and syncope is a possibility    - No sign of acute ischemia.  - Continue atenolol @ current dose   - Pt. is not a candidate to start a/c given her propensity for falls and advanced age  - Continue Plavix 75 mg po daily  - Continue statin  - Echocardiogram with grossly normal LV systolic function, mild LVH, severe AS, mild AI, mod MR, mod-severe TR, Biatrial Enlargement, mildly elevated pulmonary pressures.    - Family wants minimal intervention  - Watch creatinine and electrolytes.  Maintain K > 4 and Mag >2.  Replete prn.    - Will continue to follow with you.  - D/C planning to [facility]  Cardiology

## 2018-11-26 NOTE — PROGRESS NOTE ADULT - ASSESSMENT
Pt admitted after fall with rib fractures. Found to have RUL mass. Possible cancer.  Due to pt's advanced age, would do limited workup and treatment.  Should be stable for dc today  Has severe AS

## 2018-11-26 NOTE — GOALS OF CARE CONVERSATION - PERSONAL ADVANCE DIRECTIVE - CONVERSATION DETAILS
met pt, alert, oriented to name, not date, president or month. contacted pt son, Phillip on speaker phone, pt has hcp, will provide, pt has discussed her resuscitation wishes, son needs to further talk to his sister, Zully regarding DNR. Son spoke to his sister, both know pt wishes, danelle reviewed w son, agrees to dnr dni no tube feeding no dialysis, wants treatment for pt, IV flds, antibiotics. to continue present treatment plan, PT to see pt for [facility], unsure if need long term or help at home for pt. Dr tejeda completed molst. contact # given to son.

## 2018-11-26 NOTE — PROGRESS NOTE ADULT - SUBJECTIVE AND OBJECTIVE BOX
PULMONARY/CRITICAL CARE      INTERVAL HPI/OVERNIGHT EVENTS:  More alert, oriented. No sob, minimal chest wall discomfort.  89y FemaleHPI:  89 y female presents accompanied to ED by son and daughter in law, son states he was assisting patient in the bathroom today,  she was sitting on the commode, the house phone rang,  he went to answer it,  he heard his mother call him, found her sitting on the bathroom floor, back against the tub.  awake , alert, patient states she has right rib pain, and abdominal pain,  no nausea, no vomiting, states she did not hit her head, no neck pain, patient is on plavix, hx of stents.     Patient lives alone but family has been taking turns staying at her place taking care of her since her fall in early October. She did not come to the hospital for evaluation at that time. Family reports that they cannot take care of her any longer. She requires a lot of help with her ADLs. They have applied for Medicaid. They were told that the Medicaid aide would be in place in a few weeks. Their goal is to eventually move her back to her apartment with the 24-hour Medicaid aide. (22 Nov 2018 11:48)        PAST MEDICAL & SURGICAL HISTORY:  Stented coronary artery  HTN (hypertension)        ICU Vital Signs Last 24 Hrs  T(C): 36.3 (26 Nov 2018 08:08), Max: 36.7 (25 Nov 2018 20:39)  T(F): 97.4 (26 Nov 2018 08:08), Max: 98.1 (25 Nov 2018 20:39)  HR: 82 (26 Nov 2018 08:08) (60 - 82)  BP: 150/78 (26 Nov 2018 08:08) (103/61 - 150/78)  BP(mean): --  ABP: --  ABP(mean): --  RR: 17 (26 Nov 2018 08:08) (16 - 17)  SpO2: 97% (26 Nov 2018 08:08) (94% - 98%)    Qtts:     I&O's Summary        REVIEW OF SYSTEMS:    CONSTITUTIONAL: No fever, weight loss, or fatigue  RESPIRATORY: No cough, wheezing, chills or hemoptysis; No shortness of breath  CARDIOVASCULAR: No chest pain, palpitations, dizziness, or leg swelling  GASTROINTESTINAL: No abdominal or epigastric pain. No nausea, vomiting, or hematemesis; No diarrhea or constipation. No melena or hematochezia.  GENITOURINARY: No dysuria, frequency, hematuria, or incontinence  NEUROLOGICAL: No headaches, some memory loss, loss of strength, numbness, or tremors  MUSCULOSKELETAL: No joint pain or swelling; No muscle, back, or extremity pain, no calf tenderness    PHYSICAL EXAM:    GENERAL: NAD, well-groomed, elderly,   HEAD:  Atraumatic, Normocephalic  EYES: EOMI, PERRLA, conjunctiva and sclera clear  ENMT: No tonsillar erythema, exudates, or enlargement; Moist mucous membranes, Good dentition, No lesions  NECK: Supple, No JVD, Normal thyroid  NERVOUS SYSTEM:  Alert ,  confused. Moves all extremities  CHEST/LUNG: Clear to percussion bilaterally; No rales, rhonchi, wheezing, or rubs Decreased bs  HEART: Regular rate and rhythm; No murmurs, rubs, or gallops  ABDOMEN: Soft, Nontender, Nondistended; Bowel sounds present  EXTREMITIES:  2+ Peripheral Pulses, No clubbing, cyanosis, or edema  LYMPH: No lymphadenopathy noted  SKIN: No rashes or lesions            LABS:                vanco through     RADIOLOGY & ADDITIONAL STUDIES:      CRITICAL CARE TIME SPENT:

## 2018-11-26 NOTE — PROGRESS NOTE ADULT - SUBJECTIVE AND OBJECTIVE BOX
[facility] -- Shauna Raphael, Enoc Garsia, Ricardo Higgins Savella  Office # [phone]      Follow Up:  AFib    Subjective/Observations:   No events overnight resting comfortably in bed without complaints     REVIEW OF SYSTEMS: All other review of systems is negative unless indicated above    PAST MEDICAL & SURGICAL HISTORY:  Stented coronary artery  HTN (hypertension)      MEDICATIONS  (STANDING):  ATENolol  Tablet 25 milliGRAM(s) Oral daily  buDESOnide  80 MICROgram(s)/formoterol 4.5 MICROgram(s) Inhaler 2 Puff(s) Inhalation two times a day  clopidogrel Tablet 75 milliGRAM(s) Oral daily  docusate sodium 100 milliGRAM(s) Oral three times a day  enoxaparin Injectable 40 milliGRAM(s) SubCutaneous daily  hydrocortisone 1% Cream 1 Application(s) Topical two times a day  lidocaine   Patch 1 Patch Transdermal daily  lisinopril 10 milliGRAM(s) Oral daily  polyethylene glycol 3350 17 Gram(s) Oral daily  senna 2 Tablet(s) Oral at bedtime  simvastatin 40 milliGRAM(s) Oral at bedtime    MEDICATIONS  (PRN):  acetaminophen   Tablet .. 650 milliGRAM(s) Oral every 6 hours PRN Temp greater or equal to 38C (100.4F), Mild Pain (1 - 3)  magnesium hydroxide Suspension 30 milliLiter(s) Oral daily PRN Constipation  morphine  - Injectable 2 milliGRAM(s) IV Push every 4 hours PRN Severe Pain (7 - 10)  ondansetron Injectable 4 milliGRAM(s) IV Push every 6 hours PRN Nausea and/or Vomiting  oxyCODONE    5 mG/acetaminophen 325 mG 1 Tablet(s) Oral every 6 hours PRN Moderate Pain (4 - 6)      Allergies    No Known Allergies    Intolerances        Vital Signs Last 24 Hrs  T(C): 36.3 (26 Nov 2018 08:08), Max: 36.7 (25 Nov 2018 20:39)  T(F): 97.4 (26 Nov 2018 08:08), Max: 98.1 (25 Nov 2018 20:39)  HR: 82 (26 Nov 2018 08:08) (60 - 82)  BP: 150/78 (26 Nov 2018 08:08) (103/61 - 150/78)  BP(mean): --  RR: 17 (26 Nov 2018 08:08) (16 - 17)  SpO2: 97% (26 Nov 2018 08:08) (94% - 98%)    I&O's Summary        PHYSICAL EXAM:  TELE: Off tele   Constitutional: NAD, awake and alert, well-developed  HEENT: Moist Mucous Membranes, Anicteric  Pulmonary: Non-labored, breath sounds w/ crackles bilaterally at bases , No wheezing,  or rhonchi  Cardiovascular: Regular, S1 and S2 nl, No murmurs, rubs, gallops or clicks  Gastrointestinal: Bowel Sounds present, soft, nontender.   Lymph: No lymphadenopathy. No peripheral edema.  Skin: No visible rashes or ulcers.  Psych:  Mood & affect appropriate    LABS: All Labs Reviewed:       ECG:  < from: 12 Lead ECG (11.21.18 @ 23:43) >  Ventricular Rate 58 BPM    Atrial Rate 81 BPM    QRS Duration 116 ms    Q-T Interval 428 ms    QTC Calculation(Bezet) 420 ms    R Axis -57 degrees    T Axis 14 degrees    Diagnosis Line Atrial fibrillation with slow ventricular response  Left axis deviation  Left ventricular hypertrophy with QRS widening  Anteroseptal infarct , age undetermined  Abnormal ECG    Confirmed by margo Manzano ([id number]) on 11/22/2018 4:19:35 PM    < end of copied text >    Echo:  < from: 12 Lead ECG (11.21.18 @ 23:43) >  Ventricular Rate 58 BPM    Atrial Rate 81 BPM    QRS Duration 116 ms    Q-T Interval 428 ms    QTC Calculation(Bezet) 420 ms    R Axis -57 degrees    T Axis 14 degrees    Diagnosis Line Atrial fibrillation with slow ventricular response  Left axis deviation  Left ventricular hypertrophy with QRS widening  Anteroseptal infarct , age undetermined  Abnormal ECG    Confirmed by margo Manzano ([id number]) on 11/22/2018 4:19:35 PM    < end of copied text >    Radiology:  < from: CT Abdomen and Pelvis w/ IV Cont (11.21.18 @ 19:22) >  EXAM:  CT ABDOMEN AND PELVIS IC                            PROCEDURE DATE:  11/21/2018          INTERPRETATION:  Refer to CT chest for complete report of CT chest   abdomen and pelvis.                MALLY BRYANT M.D.,ATTENDING RADIOLOGIST  This document has been electronically signed. Nov 21 2018  7:25PM    < end of copied text >           < from: CT Chest w/ IV Cont (11.21.18 @ 19:02) >  EXAM:  CT CHEST IC                            *** ADDENDUM 11/21/2018  ***    There are 2 nondisplaced posteriorly situated fractures of right ribs,   11th and 12th ribs.      *** END OF ADDENDUM 11/21/2018  ***      PROCEDURE DATE:  11/21/2018          INTERPRETATION:  Clinical information: Right-sided rib pain    CT study of the chest abdomen and pelvis.    Axial images obtained, coronal and sagittal images computer reformatted.    IV contrast material administered.    95 cc of Omnipaque 350 intravenously administered, 5 cc discarded.    No prior studies present for comparison    Chest: Global cardiomegaly present. No evidence of pericardial effusion.   No evidence of thoracic aortic aneurysm. Central airway intact. Thyroid   gland not enlarged. No mediastinal lesions evident. No hilar lesions   evident.    Pleural base consolidation, right upper lobe. Anterior posterior   dimension, 6 cm, 2.5 cm in width, 3 cm vertical height. Cannot exclude a   neoplasm. This lesion abuts the minor fissure. Advise follow-up   evaluation.     No drainable pleural effusion. No pneumothorax. Bullous changes right   lower lobe. Small fissural fluid collection present.   Severe dorsal   kyphosis present. Compression fracture of T10, follow-up with bone scan.              Abdomen-pelvis: Hepatic parenchyma homogeneous. The gallbladder is filled   with stones. The spleen is not enlarged. The pancreas is atrophic.   Atherosclerotic changes, abdominal aorta, no evidence of aneurysm. No   retroperitoneal lymphadenopathy. No biliary ductal dilatation or ascites.   No adrenal lesions.    Large cyst lower pole of left kidney. No hydronephrotic changes.    No bowel obstruction. Diverticulosis present. Urinary bladder is filled,   no stones evident. Noevidence of free pelvic fluid. Inguinal regions   intact. No acute appearing osseous abnormalities. Calcified disc L5-S1.   Possible hemangioma of the bone, L2.        IMPRESSION: See findings described above.                ***Please see the addendum at the top of this report. It may contain   additional important information or changes.****          MALLY BRYANT M.D.,ATTENDING RADIOLOGIST  This document has been electronically signed. Nov 21 2018  7:24PM  Addend:  MALLY BRYANT M.D.,ATTENDING RADIOLOGIST  This addendum was electronically signed on: Nov 21 2018  7:39PM.    < end of copied text >  Melo Handy [facility]   Cardiology

## 2018-11-26 NOTE — PROGRESS NOTE ADULT - SUBJECTIVE AND OBJECTIVE BOX
Patient is a 89y old  Female who presents with a chief complaint of fall (26 Nov 2018 11:29)      INTERVAL HPI/OVERNIGHT EVENTS: Patient seen and examined. NAD. No complaints.    Vital Signs Last 24 Hrs  T(C): 36.3 (26 Nov 2018 08:08), Max: 36.7 (25 Nov 2018 20:39)  T(F): 97.4 (26 Nov 2018 08:08), Max: 98.1 (25 Nov 2018 20:39)  HR: 82 (26 Nov 2018 08:08) (60 - 82)  BP: 150/78 (26 Nov 2018 08:08) (103/61 - 150/78)  BP(mean): --  RR: 17 (26 Nov 2018 08:08) (16 - 17)  SpO2: 97% (26 Nov 2018 08:08) (94% - 98%)              CAPILLARY BLOOD GLUCOSE                  acetaminophen   Tablet .. 650 milliGRAM(s) Oral every 6 hours PRN  ATENolol  Tablet 25 milliGRAM(s) Oral daily  buDESOnide  80 MICROgram(s)/formoterol 4.5 MICROgram(s) Inhaler 2 Puff(s) Inhalation two times a day  clopidogrel Tablet 75 milliGRAM(s) Oral daily  docusate sodium 100 milliGRAM(s) Oral three times a day  enoxaparin Injectable 40 milliGRAM(s) SubCutaneous daily  hydrocortisone 1% Cream 1 Application(s) Topical two times a day  lidocaine   Patch 1 Patch Transdermal daily  lisinopril 10 milliGRAM(s) Oral daily  magnesium hydroxide Suspension 30 milliLiter(s) Oral daily PRN  morphine  - Injectable 2 milliGRAM(s) IV Push every 4 hours PRN  ondansetron Injectable 4 milliGRAM(s) IV Push every 6 hours PRN  oxyCODONE    5 mG/acetaminophen 325 mG 1 Tablet(s) Oral every 6 hours PRN  polyethylene glycol 3350 17 Gram(s) Oral daily  senna 2 Tablet(s) Oral at bedtime  simvastatin 40 milliGRAM(s) Oral at bedtime              REVIEW OF SYSTEMS:  CONSTITUTIONAL: No fever, no weight loss, or no fatigue  NECK: No pain, no stiffness  RESPIRATORY: No cough, no wheezing, no chills, no hemoptysis, No shortness of breath  CARDIOVASCULAR: No chest pain, no palpitations, no dizziness, no leg swelling  GASTROINTESTINAL: No abdominal pain. No nausea, no vomiting, no hematemesis; No diarrhea, no constipation. No melena, no hematochezia.  GENITOURINARY: No dysuria, no frequency, no hematuria, no incontinence  NEUROLOGICAL: No headaches, no loss of strength, no numbness, no tremors  SKIN: No itching, no burning  MUSCULOSKELETAL: No joint pain, no swelling; No muscle, no back, no extremity pain  PSYCHIATRIC: No depression, no mood swings,   HEME/LYMPH: No easy bruising, no bleeding gums  ALLERY AND IMMUNOLOGIC: No hives       Consultant(s) Notes Reviewed:  [X] YES  [ ] NO    PHYSICAL EXAM:  GENERAL: NAD  HEAD:  Atraumatic, Normocephalic  EYES: EOMI, PERRLA, conjunctiva and sclera clear  ENMT: No tonsillar erythema, exudates, or enlargement; Moist mucous membranes  NECK: Supple, No JVD  NERVOUS SYSTEM:  Awake & alert  CHEST/LUNG: Clear to auscultation bilaterally; No rales, rhonchi, wheezing,  HEART: Regular rate and rhythm  ABDOMEN: Soft, Nontender, Nondistended; Bowel sounds present  EXTREMITIES:  No clubbing, cyanosis, or edema  LYMPH: No lymphadenopathy noted  SKIN: No rashes      Advanced care planning discussed with patient/family [X] YES   [ ] NO    Advanced care planning discussed with patient/family. Advanced care planning forms reviewed/discussed/completed. 20 minutes spent.

## 2018-11-27 ENCOUNTER — TRANSCRIPTION ENCOUNTER (OUTPATIENT)
Age: 83
End: 2018-11-27

## 2018-11-27 VITALS
HEART RATE: 71 BPM | DIASTOLIC BLOOD PRESSURE: 52 MMHG | OXYGEN SATURATION: 96 % | TEMPERATURE: 98 F | SYSTOLIC BLOOD PRESSURE: 91 MMHG | RESPIRATION RATE: 17 BRPM

## 2018-11-27 PROCEDURE — 84436 ASSAY OF TOTAL THYROXINE: CPT

## 2018-11-27 PROCEDURE — 71045 X-RAY EXAM CHEST 1 VIEW: CPT

## 2018-11-27 PROCEDURE — 97110 THERAPEUTIC EXERCISES: CPT

## 2018-11-27 PROCEDURE — 72125 CT NECK SPINE W/O DYE: CPT

## 2018-11-27 PROCEDURE — 97162 PT EVAL MOD COMPLEX 30 MIN: CPT

## 2018-11-27 PROCEDURE — 99232 SBSQ HOSP IP/OBS MODERATE 35: CPT

## 2018-11-27 PROCEDURE — 36415 COLL VENOUS BLD VENIPUNCTURE: CPT

## 2018-11-27 PROCEDURE — 96376 TX/PRO/DX INJ SAME DRUG ADON: CPT

## 2018-11-27 PROCEDURE — 93306 TTE W/DOPPLER COMPLETE: CPT

## 2018-11-27 PROCEDURE — 96375 TX/PRO/DX INJ NEW DRUG ADDON: CPT

## 2018-11-27 PROCEDURE — 93005 ELECTROCARDIOGRAM TRACING: CPT

## 2018-11-27 PROCEDURE — 85027 COMPLETE CBC AUTOMATED: CPT

## 2018-11-27 PROCEDURE — 84443 ASSAY THYROID STIM HORMONE: CPT

## 2018-11-27 PROCEDURE — 80048 BASIC METABOLIC PNL TOTAL CA: CPT

## 2018-11-27 PROCEDURE — 83735 ASSAY OF MAGNESIUM: CPT

## 2018-11-27 PROCEDURE — 70450 CT HEAD/BRAIN W/O DYE: CPT

## 2018-11-27 PROCEDURE — 74177 CT ABD & PELVIS W/CONTRAST: CPT

## 2018-11-27 PROCEDURE — 94640 AIRWAY INHALATION TREATMENT: CPT

## 2018-11-27 PROCEDURE — 97530 THERAPEUTIC ACTIVITIES: CPT

## 2018-11-27 PROCEDURE — 96374 THER/PROPH/DIAG INJ IV PUSH: CPT

## 2018-11-27 PROCEDURE — 80053 COMPREHEN METABOLIC PANEL: CPT

## 2018-11-27 PROCEDURE — 99285 EMERGENCY DEPT VISIT HI MDM: CPT | Mod: 25

## 2018-11-27 PROCEDURE — 71260 CT THORAX DX C+: CPT

## 2018-11-27 RX ORDER — HYDROCORTISONE 1 %
1 OINTMENT (GRAM) TOPICAL
Qty: 0 | Refills: 0 | COMMUNITY
Start: 2018-11-27

## 2018-11-27 RX ORDER — ATENOLOL 25 MG/1
1 TABLET ORAL
Qty: 0 | Refills: 0 | COMMUNITY

## 2018-11-27 RX ORDER — DOCUSATE SODIUM 100 MG
1 CAPSULE ORAL
Qty: 0 | Refills: 0 | COMMUNITY
Start: 2018-11-27

## 2018-11-27 RX ORDER — ATENOLOL 25 MG/1
1 TABLET ORAL
Qty: 0 | Refills: 0 | COMMUNITY
Start: 2018-11-27

## 2018-11-27 RX ORDER — FLUTICASONE PROPIONATE AND SALMETEROL 50; 250 UG/1; UG/1
2 POWDER ORAL; RESPIRATORY (INHALATION)
Qty: 0 | Refills: 0 | COMMUNITY

## 2018-11-27 RX ORDER — SENNA PLUS 8.6 MG/1
2 TABLET ORAL
Qty: 0 | Refills: 0 | COMMUNITY
Start: 2018-11-27

## 2018-11-27 RX ORDER — MAGNESIUM HYDROXIDE 400 MG/1
30 TABLET, CHEWABLE ORAL
Qty: 0 | Refills: 0 | COMMUNITY
Start: 2018-11-27

## 2018-11-27 RX ORDER — POLYETHYLENE GLYCOL 3350 17 G/17G
17 POWDER, FOR SOLUTION ORAL
Qty: 0 | Refills: 0 | COMMUNITY
Start: 2018-11-27

## 2018-11-27 RX ORDER — BUDESONIDE AND FORMOTEROL FUMARATE DIHYDRATE 160; 4.5 UG/1; UG/1
2 AEROSOL RESPIRATORY (INHALATION)
Qty: 0 | Refills: 0 | COMMUNITY
Start: 2018-11-27

## 2018-11-27 RX ORDER — LIDOCAINE 4 G/100G
1 CREAM TOPICAL
Qty: 0 | Refills: 0 | COMMUNITY
Start: 2018-11-27

## 2018-11-27 RX ADMIN — OXYCODONE AND ACETAMINOPHEN 1 TABLET(S): 5; 325 TABLET ORAL at 10:19

## 2018-11-27 RX ADMIN — CLOPIDOGREL BISULFATE 75 MILLIGRAM(S): 75 TABLET, FILM COATED ORAL at 12:46

## 2018-11-27 RX ADMIN — LISINOPRIL 10 MILLIGRAM(S): 2.5 TABLET ORAL at 05:41

## 2018-11-27 RX ADMIN — Medication 1 APPLICATION(S): at 05:41

## 2018-11-27 RX ADMIN — BUDESONIDE AND FORMOTEROL FUMARATE DIHYDRATE 2 PUFF(S): 160; 4.5 AEROSOL RESPIRATORY (INHALATION) at 06:17

## 2018-11-27 RX ADMIN — POLYETHYLENE GLYCOL 3350 17 GRAM(S): 17 POWDER, FOR SOLUTION ORAL at 12:49

## 2018-11-27 RX ADMIN — Medication 100 MILLIGRAM(S): at 05:41

## 2018-11-27 RX ADMIN — ENOXAPARIN SODIUM 40 MILLIGRAM(S): 100 INJECTION SUBCUTANEOUS at 12:48

## 2018-11-27 RX ADMIN — LIDOCAINE 1 PATCH: 4 CREAM TOPICAL at 12:46

## 2018-11-27 RX ADMIN — LIDOCAINE 1 PATCH: 4 CREAM TOPICAL at 01:16

## 2018-11-27 RX ADMIN — OXYCODONE AND ACETAMINOPHEN 1 TABLET(S): 5; 325 TABLET ORAL at 09:19

## 2018-11-27 RX ADMIN — OXYCODONE AND ACETAMINOPHEN 1 TABLET(S): 5; 325 TABLET ORAL at 16:35

## 2018-11-27 RX ADMIN — ATENOLOL 25 MILLIGRAM(S): 25 TABLET ORAL at 05:41

## 2018-11-27 RX ADMIN — Medication 100 MILLIGRAM(S): at 13:04

## 2018-11-27 RX ADMIN — OXYCODONE AND ACETAMINOPHEN 1 TABLET(S): 5; 325 TABLET ORAL at 17:35

## 2018-11-27 NOTE — PROGRESS NOTE ADULT - ASSESSMENT
Mrs. Barnes is a 89 year old with reported CAD on Plavix, here with a fall. Found to have Lung mass and rib fractures.  Found to be in Atrial fibrillation with slow ventricular response on EKG now resolved s/p atenolol dose decrease. Unclear if there is an AF history.  The etiology of her fall is unclear, and syncope is a possibility    - Afib with rate control.  No significant events. now off telemetry  - No sign of acute ischemia.  - Continue Atenolol 25 mg daily  - For Afib, patient is a poor candidate for anticoagulation given advance age and her propensity for falls  - Continue Plavix 75 mg po daily  - Continue statin  - Continue ACEI  - Echocardiogram with grossly normal LV systolic function, mild LVH, severe AS, mild AI, mod MR, mod-severe TR, Biatrial Enlargement, mildly elevated pulmonary pressures.    - CT shows lung nodule suspicious of neoplasm, however, family prefers no workup  - Patient is a DNR/DNI    - DVT ppx  - Other workup per primary  - D/C planning to [facility] Whyte NP  Cardiology            Chaparrita Whyte NP  Cardiology

## 2018-11-27 NOTE — DISCHARGE NOTE ADULT - CARE PROVIDER_API CALL
Carmen Saldaña), Family Medicine  [address] [place] [id number]  Phone: [phone]  Fax: [phone]

## 2018-11-27 NOTE — PROGRESS NOTE ADULT - SUBJECTIVE AND OBJECTIVE BOX
[facility] -- Shauna Raphael, Sun, Enoc, Ricardo Higgins Savella  Office # [phone]    Follow Up:  Afib    Subjective/Observations: On room air, sitting up.  Not in respiratory discomfort, however, c/o difficulty breathing laying flat.  c/o arthritic pain.  Denies CP or palpitations.    REVIEW OF SYSTEMS: All other review of systems is negative unless indicated above    PAST MEDICAL & SURGICAL HISTORY:  Stented coronary artery  HTN (hypertension)    MEDICATIONS  (STANDING):  ATENolol  Tablet 25 milliGRAM(s) Oral daily  buDESOnide  80 MICROgram(s)/formoterol 4.5 MICROgram(s) Inhaler 2 Puff(s) Inhalation two times a day  clopidogrel Tablet 75 milliGRAM(s) Oral daily  docusate sodium 100 milliGRAM(s) Oral three times a day  enoxaparin Injectable 40 milliGRAM(s) SubCutaneous daily  hydrocortisone 1% Cream 1 Application(s) Topical two times a day  lidocaine   Patch 1 Patch Transdermal daily  lisinopril 10 milliGRAM(s) Oral daily  polyethylene glycol 3350 17 Gram(s) Oral daily  senna 2 Tablet(s) Oral at bedtime  simvastatin 40 milliGRAM(s) Oral at bedtime    MEDICATIONS  (PRN):  acetaminophen   Tablet .. 650 milliGRAM(s) Oral every 6 hours PRN Temp greater or equal to 38C (100.4F), Mild Pain (1 - 3)  magnesium hydroxide Suspension 30 milliLiter(s) Oral daily PRN Constipation  morphine  - Injectable 2 milliGRAM(s) IV Push every 4 hours PRN Severe Pain (7 - 10)  ondansetron Injectable 4 milliGRAM(s) IV Push every 6 hours PRN Nausea and/or Vomiting  oxyCODONE    5 mG/acetaminophen 325 mG 1 Tablet(s) Oral every 6 hours PRN Moderate Pain (4 - 6)    Allergies    No Known Allergies    Intolerances    Vital Signs Last 24 Hrs  T(C): 37 (27 Nov 2018 07:39), Max: 37.1 (26 Nov 2018 12:23)  T(F): 98.6 (27 Nov 2018 07:39), Max: 98.8 (26 Nov 2018 12:23)  HR: 74 (27 Nov 2018 07:39) (60 - 74)  BP: 95/56 (27 Nov 2018 07:39) (95/56 - 148/67)  BP(mean): --  RR: 18 (27 Nov 2018 07:39) (17 - 18)  SpO2: 97% (27 Nov 2018 07:39) (94% - 97%)    I&O's Summary      PHYSICAL EXAM:  TELE: Not on tele  Constitutional: NAD, awake and alert, cachectic  HEENT: Moist Mucous Membranes, Anicteric  Pulmonary: Non-labored, breath sounds are clear bilaterally, No wheezing, rales or rhonchi  Cardiovascular: Irregularly irregular, S1 and S2, + murmurs, rubs, gallops or clicks  Gastrointestinal: Bowel Sounds present, soft, nontender.   Lymph: No peripheral edema. No lymphadenopathy.  Skin: No visible rashes or ulcers.  Psych: [place]  LABS: All Labs Reviewed:    < from: TTE Echo Doppler w/o Cont (11.22.18 @ 14:46) >     EXAM:  ECHO TTE WO CON COMP W DOPPLR         PROCEDURE DATE:  11/22/2018        INTERPRETATION:  Ordering Physician: KELBY WADSWORTH [id number]    Indication: Abnormal EKG    Study Quality: Technically difficult   A complete echocardiographic studywas performed utilizing standard   protocol including spectral and color Doppler in all echocardiographic   windows.    Height: 152 cm  Weight: 52 kg  BSA: 1.5 sq m  Blood Pressure: 104/70    MEASUREMENTS  IVS: 1.2cm  PWT: 1.2cm  LA: 5.9cm  LVIDd: 4.2cm  LVIDs: 3.1cm    RVSP: 45mmHg    FINDINGS  Left Ventricle: Mild concentric left ventricular hypertrophy. Grossly   normal LV systolic function. No segmental wall motion abnormalities are   visualized  Aortic Valve: Calcified aortic valve with decreased opening. The peak   aortic valve gradient is equal to 82 mmHg, and the mean aortic valve   gradient is equal to 47 mmHg, consistent with severe aortic stenosis.   Estimated valve area is 0.5 squared centimeters. Mild aortic insufficiency  MitralValve: Mitral annular calcification. Calcified and thickened   mitral valve with decreased opening. Moderate mitral regurgitation  Tricuspid Valve: Moderate to severe tricuspid regurgitation  Pulmonic Valve: Not well-visualized.  Left Atrium: Severe left atrial enlargement  Right Ventricle: Not well-visualized. There appears to be mild right   ventricular enlargement with normal systolic function  Right Atrium: Enlarged  Diastolic Function: Doppler evidence of moderate diastolic dysfunction  Pericardium/Pleura: No pericardial effusion  Hemodynamics: The pulmonary artery systolic pressure is estimated to be   45 mmHg, assuming the right atrial pressure is equal to 8 mmHg,   consistent with mild pulmonary hypertension.    CONCLUSIONS:  1. Technically difficult study  2.  Mild concentric left ventricular hypertrophy. Grossly normal LV   systolic function.   3. Calcified aortic valve with decreased opening. Severe aortic stenosis.   Mild aortic insufficiency  4. Calcified and thickened mitralvalve with decreased opening. Moderate   mitral regurgitation  5. Moderate to severe tricuspid regurgitation  6. Biatrial enlargement  7. Mildly elevated pulmonary pressures  8. No pericardial effusion    No prior exam for comparison.    ERIC ÁLVAREZ   This document has been electronically signed. Nov 22 2018  3:04PM      < end of copied text >    < from: CT Chest w/ IV Cont (11.21.18 @ 19:02) >    EXAM:  CT CHEST IC                            *** ADDENDUM 11/21/2018  ***    There are 2 nondisplaced posteriorly situated fractures of right ribs,   11th and 12th ribs.      *** END OF ADDENDUM 11/21/2018  ***      PROCEDURE DATE:  11/21/2018      INTERPRETATION:  Clinical information: Right-sided rib pain    CT study of the chest abdomen and pelvis.    Axial images obtained, coronal and sagittal images computer reformatted.    IV contrast material administered.    95 cc of Omnipaque 350 intravenously administered, 5 cc discarded.    No prior studies present for comparison    Chest: Global cardiomegaly present. No evidence of pericardial effusion.   No evidence of thoracic aortic aneurysm. Central airway intact. Thyroid   gland not enlarged. No mediastinal lesions evident. No hilar lesions   evident.    Pleural base consolidation, right upper lobe. Anterior posterior   dimension, 6 cm, 2.5 cm in width, 3 cm vertical height. Cannot exclude a   neoplasm. This lesion abuts the minor fissure. Advise follow-up   evaluation.     No drainable pleural effusion. No pneumothorax. Bullous changes right   lower lobe. Small fissural fluid collection present.   Severe dorsal   kyphosis present. Compression fracture of T10, follow-up with bone scan.      Abdomen-pelvis: Hepatic parenchyma homogeneous. The gallbladder is filled   with stones. The spleen is not enlarged. The pancreas is atrophic.   Atherosclerotic changes, abdominal aorta, no evidence of aneurysm. No   retroperitoneal lymphadenopathy. No biliary ductal dilatation or ascites.   No adrenal lesions.    Large cyst lower pole of left kidney. No hydronephrotic changes.    No bowel obstruction. Diverticulosis present. Urinary bladder is filled,   no stones evident. Noevidence of free pelvic fluid. Inguinal regions   intact. No acute appearing osseous abnormalities. Calcified disc L5-S1.   Possible hemangioma of the bone, L2.    IMPRESSION: See findings described above.    ***Please see the addendum at the top of this report. It may contain   additional important information or changes.****    MALLY BRYANT M.D.,ATTENDING RADIOLOGIST  This document has been electronically signed. Nov 21 2018  7:24PM  Addend:  MALLY BRYANT M.D.,ATTENDING RADIOLOGIST  This addendum was electronically signed on: Nov 21 2018  7:39PM.      < end of copied text >

## 2018-11-27 NOTE — PROGRESS NOTE ADULT - PROBLEM SELECTOR PROBLEM 3
ASHD (arteriosclerotic heart disease)
Atrial fibrillation, new onset

## 2018-11-27 NOTE — PROGRESS NOTE ADULT - SUBJECTIVE AND OBJECTIVE BOX
PULMONARY/CRITICAL CARE      INTERVAL HPI/OVERNIGHT EVENTS:   No sob, cp.  89y FemaleHPI:  89 y female presents accompanied to ED by son and daughter in law, son states he was assisting patient in the bathroom today,  she was sitting on the commode, the house phone rang,  he went to answer it,  he heard his mother call him, found her sitting on the bathroom floor, back against the tub.  awake , alert, patient states she has right rib pain, and abdominal pain,  no nausea, no vomiting, states she did not hit her head, no neck pain, patient is on plavix, hx of stents.     Patient lives alone but family has been taking turns staying at her place taking care of her since her fall in early October. She did not come to the hospital for evaluation at that time. Family reports that they cannot take care of her any longer. She requires a lot of help with her ADLs. They have applied for Medicaid. They were told that the Medicaid aide would be in place in a few weeks. Their goal is to eventually move her back to her apartment with the 24-hour Medicaid aide. (22 Nov 2018 11:48)        PAST MEDICAL & SURGICAL HISTORY:  Stented coronary artery  HTN (hypertension)        ICU Vital Signs Last 24 Hrs  T(C): 37 (27 Nov 2018 07:39), Max: 37.1 (26 Nov 2018 12:23)  T(F): 98.6 (27 Nov 2018 07:39), Max: 98.8 (26 Nov 2018 12:23)  HR: 74 (27 Nov 2018 07:39) (60 - 74)  BP: 95/56 (27 Nov 2018 07:39) (95/56 - 148/67)  BP(mean): --  ABP: --  ABP(mean): --  RR: 18 (27 Nov 2018 07:39) (17 - 18)  SpO2: 97% (27 Nov 2018 07:39) (94% - 97%)    Qtts:     I&O's Summary      REVIEW OF SYSTEMS:    CONSTITUTIONAL: No fever, weight loss, or fatigue  RESPIRATORY: No cough, wheezing, chills or hemoptysis; No shortness of breath  CARDIOVASCULAR: No chest pain, palpitations, dizziness, or leg swelling  GASTROINTESTINAL: No abdominal or epigastric pain. No nausea, vomiting, or hematemesis; No diarrhea or constipation. No melena or hematochezia.  GENITOURINARY: No dysuria, frequency, hematuria, or incontinence  NEUROLOGICAL: No headaches, some memory loss, loss of strength, numbness, or tremors  MUSCULOSKELETAL: No joint pain or swelling; No muscle, back, or extremity pain, no calf tenderness    PHYSICAL EXAM:    GENERAL: NAD, well-groomed, elderly,   HEAD:  Atraumatic, Normocephalic  EYES: EOMI, PERRLA, conjunctiva and sclera clear  ENMT: No tonsillar erythema, exudates, or enlargement; Moist mucous membranes, Good dentition, No lesions  NECK: Supple, No JVD, Normal thyroid  NERVOUS SYSTEM:  Alert ,  confused. Moves all extremities  CHEST/LUNG: Clear to percussion bilaterally; No rales, rhonchi, wheezing, or rubs Decreased bs  HEART: Regular rate and rhythm; No murmurs, rubs, or gallops  ABDOMEN: Soft, Nontender, Nondistended; Bowel sounds present  EXTREMITIES:  2+ Peripheral Pulses, No clubbing, cyanosis, or edema  LYMPH: No lymphadenopathy noted  SKIN: No rashes or lesions        LABS:                vanco through     RADIOLOGY & ADDITIONAL STUDIES:      CRITICAL CARE TIME SPENT:

## 2018-11-27 NOTE — PROGRESS NOTE ADULT - PROBLEM SELECTOR PROBLEM 2
Lung mass
Rib fractures
Lung mass

## 2018-11-27 NOTE — DISCHARGE NOTE ADULT - MEDICATION SUMMARY - MEDICATIONS TO STOP TAKING
I will STOP taking the medications listed below when I get home from the hospital:    Advair HFA 45 mcg-21 mcg/inh inhalation aerosol  -- 2 puff(s) inhaled 2 times a day

## 2018-11-27 NOTE — PROGRESS NOTE ADULT - PROBLEM SELECTOR PLAN 1
Fall precautions   PT  D/C planning to [facility]
Family wants no workup of lung mass.  Limited mclean and rx  Placement  Palliative eval
Family wants no workup of lung mass.  Limited mclean and rx  Placement  Palliative eval
Pet scan as outpt  Limited mclean and rx
Pet scan as outpt  Limited mclean and rx  ?Placement  Palliative eval
Pet scan as outpt  Limited mclean and rx  Placement  Palliative eval
Fall precautions   PT  D/C planning to [facility]

## 2018-11-27 NOTE — DISCHARGE NOTE ADULT - PLAN OF CARE
. PT as per [facility] Continue current medications  No AC  Follow-up with your primary care doctor within 1 week. Supportive care  Family wants no further work-up   Patient is DNR/DNI

## 2018-11-27 NOTE — PROGRESS NOTE ADULT - PROBLEM SELECTOR PROBLEM 1
Fall in home, initial encounter
R/O Lung mass
Fall in home, initial encounter

## 2018-11-27 NOTE — PROGRESS NOTE ADULT - PROBLEM SELECTOR PLAN 4
Continue quinipril and atenolol

## 2018-11-27 NOTE — PROGRESS NOTE ADULT - ATTENDING COMMENTS
Chart reviewed    Patient examined    Agree with plan as outlined above
The patient was personally seen and examined, in addition to being examined and evaluated by NP.  All elements of the note were edited where appropriate.
Seen/examined. agree with above
I personally saw and examined the patient in detail.  I have spoken to the above provider regarding the assessment and plan of care.  I reviewed the above assessment and plan of care, and agree.  I have made changes in the body of the note where appropriate.

## 2018-11-27 NOTE — PROGRESS NOTE ADULT - PROVIDER SPECIALTY LIST ADULT
Cardiology
Internal Medicine
Pulmonology
Cardiology
Internal Medicine

## 2018-11-27 NOTE — PROGRESS NOTE ADULT - SUBJECTIVE AND OBJECTIVE BOX
Patient is a 89y old  Female who presents with a chief complaint of fall (27 Nov 2018 08:56)      INTERVAL HPI/OVERNIGHT EVENTS: Patient seen and examined. NAD. No complaints.    Vital Signs Last 24 Hrs  T(C): 36.4 (27 Nov 2018 11:15), Max: 37.1 (26 Nov 2018 12:23)  T(F): 97.6 (27 Nov 2018 11:15), Max: 98.8 (26 Nov 2018 12:23)  HR: 64 (27 Nov 2018 11:15) (60 - 74)  BP: 114/71 (27 Nov 2018 11:15) (95/56 - 148/67)  BP(mean): --  RR: 18 (27 Nov 2018 11:15) (17 - 18)  SpO2: 98% (27 Nov 2018 11:15) (94% - 98%)              CAPILLARY BLOOD GLUCOSE                  acetaminophen   Tablet .. 650 milliGRAM(s) Oral every 6 hours PRN  ATENolol  Tablet 25 milliGRAM(s) Oral daily  buDESOnide  80 MICROgram(s)/formoterol 4.5 MICROgram(s) Inhaler 2 Puff(s) Inhalation two times a day  clopidogrel Tablet 75 milliGRAM(s) Oral daily  docusate sodium 100 milliGRAM(s) Oral three times a day  enoxaparin Injectable 40 milliGRAM(s) SubCutaneous daily  hydrocortisone 1% Cream 1 Application(s) Topical two times a day  lidocaine   Patch 1 Patch Transdermal daily  lisinopril 10 milliGRAM(s) Oral daily  magnesium hydroxide Suspension 30 milliLiter(s) Oral daily PRN  morphine  - Injectable 2 milliGRAM(s) IV Push every 4 hours PRN  ondansetron Injectable 4 milliGRAM(s) IV Push every 6 hours PRN  oxyCODONE    5 mG/acetaminophen 325 mG 1 Tablet(s) Oral every 6 hours PRN  polyethylene glycol 3350 17 Gram(s) Oral daily  senna 2 Tablet(s) Oral at bedtime  simvastatin 40 milliGRAM(s) Oral at bedtime              REVIEW OF SYSTEMS:  CONSTITUTIONAL: No fever, no weight loss, or no fatigue  NECK: No pain, no stiffness  RESPIRATORY: No cough, no wheezing, no chills, no hemoptysis, No shortness of breath  CARDIOVASCULAR: No chest pain, no palpitations, no dizziness, no leg swelling  GASTROINTESTINAL: No abdominal pain. No nausea, no vomiting, no hematemesis; No diarrhea, no constipation. No melena, no hematochezia.  GENITOURINARY: No dysuria, no frequency, no hematuria, no incontinence  NEUROLOGICAL: No headaches, no loss of strength, no numbness, no tremors  SKIN: No itching, no burning  MUSCULOSKELETAL: No joint pain, no swelling; No muscle, no back, no extremity pain  PSYCHIATRIC: No depression, no mood swings,   HEME/LYMPH: No easy bruising, no bleeding gums  ALLERY AND IMMUNOLOGIC: No hives       Consultant(s) Notes Reviewed:  [X] YES  [ ] NO    PHYSICAL EXAM:  GENERAL: NAD  HEAD:  Atraumatic, Normocephalic  EYES: EOMI, PERRLA, conjunctiva and sclera clear  ENMT: No tonsillar erythema, exudates, or enlargement; Moist mucous membranes  NECK: Supple, No JVD  NERVOUS SYSTEM:  Awake & alert  CHEST/LUNG: Clear to auscultation bilaterally; No rales, rhonchi, wheezing,  HEART: Regular rate and rhythm  ABDOMEN: Soft, Nontender, Nondistended; Bowel sounds present  EXTREMITIES:  No clubbing, cyanosis, or edema  LYMPH: No lymphadenopathy noted  SKIN: No rashes      Advanced care planning discussed with patient/family [X] YES   [ ] NO    Advanced care planning discussed with patient/family. Advanced care planning forms reviewed/discussed/completed. 20 minutes spent.

## 2018-11-27 NOTE — PROGRESS NOTE ADULT - PROBLEM SELECTOR PLAN 2
Pulmonary consult noted  Outpatient PET scan if family wants  Would recommend limited work-up due to patient's advanced age  Family refusing any work-up
incentive
Pulmonary consult noted  Outpatient PET scan if family wants  Would recommend limited work-up due to patient's advanced age  Family refusing any work-up

## 2018-11-27 NOTE — PROGRESS NOTE ADULT - PROBLEM SELECTOR PLAN 3
Cardio consult  Continue atenolol
Continue meds
Cardio consult  Continue atenolol

## 2018-11-27 NOTE — DISCHARGE NOTE ADULT - PATIENT PORTAL LINK FT
You can access the [facility] Patient Portal, offered by [facility], by registering with the following website: [email or web address]

## 2018-11-27 NOTE — DISCHARGE NOTE ADULT - HOSPITAL COURSE
89 y female presents accompanied to ED by son and daughter in law, son states he was assisting patient in the bathroom today,  she was sitting on the commode, the house phone rang,  he went to answer it,  he heard his mother call him, found her sitting on the bathroom floor, back against the tub.  awake , alert, patient states she has right rib pain, and abdominal pain,  no nausea, no vomiting, states she did not hit her head, no neck pain, patient is on plavix, hx of stents.     Patient lives alone but family has been taking turns staying at her place taking care of her since her fall in early October. She did not come to the hospital for evaluation at that time. Family reports that they cannot take care of her any longer. She requires a lot of help with her ADLs. They have applied for Medicaid. They were told that the Medicaid aide would be in place in a few weeks. Their goal is to eventually move her back to her apartment with the 24-hour Medicaid aide.    Patient sustained 2 broken ribs.  Found to have lung mass. Family wants no further work-up.  Found to be in new AF, rate controlled. Not a candidate for full dose AC  Going to [facility]    >30 minutes spent on discharge

## 2018-11-27 NOTE — DISCHARGE NOTE ADULT - MEDICATION SUMMARY - MEDICATIONS TO TAKE
I will START or STAY ON the medications listed below when I get home from the hospital:    quinapril 10 mg oral tablet  -- 1 tab(s) by mouth once a day  -- Indication: For =    magnesium hydroxide 8% oral suspension  -- 30 milliliter(s) by mouth once a day, As needed, Constipation  -- Indication: For =    simvastatin 40 mg oral tablet  -- 1 tab(s) by mouth once a day (at bedtime)  -- Indication: For =    clopidogrel 75 mg oral tablet  -- 1 tab(s) by mouth once a day  -- Indication: For =    atenolol 25 mg oral tablet  -- 1 tab(s) by mouth once a day  -- Indication: For =    budesonide-formoterol 80 mcg-4.5 mcg/inh inhalation aerosol  -- 2 puff(s) inhaled 2 times a day  -- Indication: For =    hydrocortisone 1% topical cream  -- 1 application on skin 2 times a day  -- Indication: For =    lidocaine 5% topical film  -- Apply on skin to affected area once a day  -- Indication: For =    docusate sodium 100 mg oral capsule  -- 1 cap(s) by mouth 3 times a day  -- Indication: For =    polyethylene glycol 3350 oral powder for reconstitution  -- 17 gram(s) by mouth once a day  -- Indication: For =    senna oral tablet  -- 2 tab(s) by mouth once a day (at bedtime)  -- Indication: For =

## 2018-11-27 NOTE — PROGRESS NOTE ADULT - PROBLEM SELECTOR PROBLEM 4
Essential hypertension
Fall in home, initial encounter

## 2019-02-26 ENCOUNTER — EMERGENCY (EMERGENCY)
Facility: HOSPITAL | Age: 84
LOS: 1 days | Discharge: NURSING, REHAB OR LONG-TERM CARE | End: 2019-02-26
Attending: EMERGENCY MEDICINE | Admitting: EMERGENCY MEDICINE
Payer: MEDICARE

## 2019-02-26 VITALS
SYSTOLIC BLOOD PRESSURE: 110 MMHG | RESPIRATION RATE: 16 BRPM | HEART RATE: 68 BPM | TEMPERATURE: 97 F | DIASTOLIC BLOOD PRESSURE: 60 MMHG | OXYGEN SATURATION: 98 %

## 2019-02-26 VITALS
TEMPERATURE: 97 F | SYSTOLIC BLOOD PRESSURE: 106 MMHG | WEIGHT: 95.02 LBS | OXYGEN SATURATION: 100 % | HEART RATE: 107 BPM | HEIGHT: 59 IN | RESPIRATION RATE: 17 BRPM | DIASTOLIC BLOOD PRESSURE: 66 MMHG

## 2019-02-26 PROBLEM — I10 ESSENTIAL (PRIMARY) HYPERTENSION: Chronic | Status: ACTIVE | Noted: 2018-11-21

## 2019-02-26 PROBLEM — Z95.5 PRESENCE OF CORONARY ANGIOPLASTY IMPLANT AND GRAFT: Chronic | Status: ACTIVE | Noted: 2018-11-21

## 2019-02-26 LAB
ALBUMIN SERPL ELPH-MCNC: 2.8 G/DL — LOW (ref 3.3–5)
ALP SERPL-CCNC: 67 U/L — SIGNIFICANT CHANGE UP (ref 40–120)
ALT FLD-CCNC: 21 U/L — SIGNIFICANT CHANGE UP (ref 12–78)
ANION GAP SERPL CALC-SCNC: 5 MMOL/L — SIGNIFICANT CHANGE UP (ref 5–17)
APPEARANCE UR: ABNORMAL
AST SERPL-CCNC: 17 U/L — SIGNIFICANT CHANGE UP (ref 15–37)
BACTERIA # UR AUTO: ABNORMAL
BASOPHILS # BLD AUTO: 0.06 K/UL — SIGNIFICANT CHANGE UP (ref 0–0.2)
BASOPHILS NFR BLD AUTO: 0.9 % — SIGNIFICANT CHANGE UP (ref 0–2)
BILIRUB SERPL-MCNC: 1.1 MG/DL — SIGNIFICANT CHANGE UP (ref 0.2–1.2)
BILIRUB UR-MCNC: ABNORMAL
BUN SERPL-MCNC: 21 MG/DL — SIGNIFICANT CHANGE UP (ref 7–23)
CALCIUM SERPL-MCNC: 8.9 MG/DL — SIGNIFICANT CHANGE UP (ref 8.5–10.1)
CHLORIDE SERPL-SCNC: 108 MMOL/L — SIGNIFICANT CHANGE UP (ref 96–108)
CO2 SERPL-SCNC: 31 MMOL/L — SIGNIFICANT CHANGE UP (ref 22–31)
COLOR SPEC: ABNORMAL
CREAT SERPL-MCNC: 0.91 MG/DL — SIGNIFICANT CHANGE UP (ref 0.5–1.3)
DIFF PNL FLD: ABNORMAL
EOSINOPHIL # BLD AUTO: 0.04 K/UL — SIGNIFICANT CHANGE UP (ref 0–0.5)
EOSINOPHIL NFR BLD AUTO: 0.6 % — SIGNIFICANT CHANGE UP (ref 0–6)
EPI CELLS # UR: SIGNIFICANT CHANGE UP
GLUCOSE SERPL-MCNC: 85 MG/DL — SIGNIFICANT CHANGE UP (ref 70–99)
GLUCOSE UR QL: NEGATIVE — SIGNIFICANT CHANGE UP
HCT VFR BLD CALC: 45.3 % — HIGH (ref 34.5–45)
HGB BLD-MCNC: 14.2 G/DL — SIGNIFICANT CHANGE UP (ref 11.5–15.5)
IMM GRANULOCYTES NFR BLD AUTO: 0.6 % — SIGNIFICANT CHANGE UP (ref 0–1.5)
KETONES UR-MCNC: NEGATIVE — SIGNIFICANT CHANGE UP
LACTATE SERPL-SCNC: 1.9 MMOL/L — SIGNIFICANT CHANGE UP (ref 0.7–2)
LACTATE SERPL-SCNC: 2.8 MMOL/L — HIGH (ref 0.7–2)
LEUKOCYTE ESTERASE UR-ACNC: ABNORMAL
LYMPHOCYTES # BLD AUTO: 2.03 K/UL — SIGNIFICANT CHANGE UP (ref 1–3.3)
LYMPHOCYTES # BLD AUTO: 29.3 % — SIGNIFICANT CHANGE UP (ref 13–44)
MCHC RBC-ENTMCNC: 31.3 GM/DL — LOW (ref 32–36)
MCHC RBC-ENTMCNC: 31.3 PG — SIGNIFICANT CHANGE UP (ref 27–34)
MCV RBC AUTO: 100 FL — SIGNIFICANT CHANGE UP (ref 80–100)
MONOCYTES # BLD AUTO: 0.42 K/UL — SIGNIFICANT CHANGE UP (ref 0–0.9)
MONOCYTES NFR BLD AUTO: 6.1 % — SIGNIFICANT CHANGE UP (ref 2–14)
NEUTROPHILS # BLD AUTO: 4.34 K/UL — SIGNIFICANT CHANGE UP (ref 1.8–7.4)
NEUTROPHILS NFR BLD AUTO: 62.5 % — SIGNIFICANT CHANGE UP (ref 43–77)
NITRITE UR-MCNC: POSITIVE
NRBC # BLD: 0 /100 WBCS — SIGNIFICANT CHANGE UP (ref 0–0)
PH UR: 5 — SIGNIFICANT CHANGE UP (ref 5–8)
PLATELET # BLD AUTO: 175 K/UL — SIGNIFICANT CHANGE UP (ref 150–400)
POTASSIUM SERPL-MCNC: 4.6 MMOL/L — SIGNIFICANT CHANGE UP (ref 3.5–5.3)
POTASSIUM SERPL-SCNC: 4.6 MMOL/L — SIGNIFICANT CHANGE UP (ref 3.5–5.3)
PROT SERPL-MCNC: 6.3 G/DL — SIGNIFICANT CHANGE UP (ref 6–8.3)
PROT UR-MCNC: 25 MG/DL
RBC # BLD: 4.53 M/UL — SIGNIFICANT CHANGE UP (ref 3.8–5.2)
RBC # FLD: 16.5 % — HIGH (ref 10.3–14.5)
RBC CASTS # UR COMP ASSIST: ABNORMAL /HPF (ref 0–4)
SODIUM SERPL-SCNC: 144 MMOL/L — SIGNIFICANT CHANGE UP (ref 135–145)
SP GR SPEC: 1.02 — SIGNIFICANT CHANGE UP (ref 1.01–1.02)
UROBILINOGEN FLD QL: 4
WBC # BLD: 6.93 K/UL — SIGNIFICANT CHANGE UP (ref 3.8–10.5)
WBC # FLD AUTO: 6.93 K/UL — SIGNIFICANT CHANGE UP (ref 3.8–10.5)
WBC UR QL: ABNORMAL

## 2019-02-26 PROCEDURE — 87040 BLOOD CULTURE FOR BACTERIA: CPT

## 2019-02-26 PROCEDURE — 70450 CT HEAD/BRAIN W/O DYE: CPT | Mod: 26

## 2019-02-26 PROCEDURE — 36415 COLL VENOUS BLD VENIPUNCTURE: CPT

## 2019-02-26 PROCEDURE — 72125 CT NECK SPINE W/O DYE: CPT

## 2019-02-26 PROCEDURE — 72170 X-RAY EXAM OF PELVIS: CPT | Mod: 26

## 2019-02-26 PROCEDURE — 71045 X-RAY EXAM CHEST 1 VIEW: CPT

## 2019-02-26 PROCEDURE — 72125 CT NECK SPINE W/O DYE: CPT | Mod: 26

## 2019-02-26 PROCEDURE — 80053 COMPREHEN METABOLIC PANEL: CPT

## 2019-02-26 PROCEDURE — 81001 URINALYSIS AUTO W/SCOPE: CPT

## 2019-02-26 PROCEDURE — 70450 CT HEAD/BRAIN W/O DYE: CPT

## 2019-02-26 PROCEDURE — 87086 URINE CULTURE/COLONY COUNT: CPT

## 2019-02-26 PROCEDURE — 87186 SC STD MICRODIL/AGAR DIL: CPT

## 2019-02-26 PROCEDURE — 96365 THER/PROPH/DIAG IV INF INIT: CPT

## 2019-02-26 PROCEDURE — 71045 X-RAY EXAM CHEST 1 VIEW: CPT | Mod: 26

## 2019-02-26 PROCEDURE — 99284 EMERGENCY DEPT VISIT MOD MDM: CPT

## 2019-02-26 PROCEDURE — 72170 X-RAY EXAM OF PELVIS: CPT

## 2019-02-26 PROCEDURE — 83605 ASSAY OF LACTIC ACID: CPT

## 2019-02-26 PROCEDURE — 99284 EMERGENCY DEPT VISIT MOD MDM: CPT | Mod: 25

## 2019-02-26 PROCEDURE — 85027 COMPLETE CBC AUTOMATED: CPT

## 2019-02-26 RX ORDER — CEFUROXIME AXETIL 250 MG
1 TABLET ORAL
Qty: 20 | Refills: 0 | OUTPATIENT
Start: 2019-02-26 | End: 2019-03-07

## 2019-02-26 RX ORDER — CEFTRIAXONE 500 MG/1
1 INJECTION, POWDER, FOR SOLUTION INTRAMUSCULAR; INTRAVENOUS ONCE
Qty: 0 | Refills: 0 | Status: COMPLETED | OUTPATIENT
Start: 2019-02-26 | End: 2019-02-26

## 2019-02-26 RX ORDER — SODIUM CHLORIDE 9 MG/ML
1000 INJECTION INTRAMUSCULAR; INTRAVENOUS; SUBCUTANEOUS
Qty: 0 | Refills: 0 | Status: COMPLETED | OUTPATIENT
Start: 2019-02-26 | End: 2019-02-26

## 2019-02-26 RX ADMIN — CEFTRIAXONE 1 GRAM(S): 500 INJECTION, POWDER, FOR SOLUTION INTRAMUSCULAR; INTRAVENOUS at 19:29

## 2019-02-26 RX ADMIN — CEFTRIAXONE 100 GRAM(S): 500 INJECTION, POWDER, FOR SOLUTION INTRAMUSCULAR; INTRAVENOUS at 18:40

## 2019-02-26 RX ADMIN — SODIUM CHLORIDE 1000 MILLILITER(S): 9 INJECTION INTRAMUSCULAR; INTRAVENOUS; SUBCUTANEOUS at 19:29

## 2019-02-26 RX ADMIN — SODIUM CHLORIDE 1000 MILLILITER(S): 9 INJECTION INTRAMUSCULAR; INTRAVENOUS; SUBCUTANEOUS at 14:42

## 2019-02-26 RX ADMIN — SODIUM CHLORIDE 1000 MILLILITER(S): 9 INJECTION INTRAMUSCULAR; INTRAVENOUS; SUBCUTANEOUS at 15:33

## 2019-02-26 NOTE — ED PROVIDER NOTE - PROGRESS NOTE DETAILS
Pt doing well, no acute co or changes. Awaiting UA. ()pt refusing cath). Dw pts son, wants pt back to SNF,. Pt will await UA, otpt fu with PMD.

## 2019-02-26 NOTE — ED PROVIDER NOTE - OBJECTIVE STATEMENT
89 yo F p/w fall today at snf. Was unwitnessed, found on floor - apparently fell. Pt with no co, unable to give hx. No other hx avail.

## 2019-02-26 NOTE — ED ADULT NURSE NOTE - OBJECTIVE STATEMENT
Pt BIBA with the c/o unwitnessed fall. Pt is legally blind and [place]. From fall pt sustained abrasion to right side of head (temporal) area. No active bleeding noted. As per Md's orders IV samira placed blood specimen obtained and sent to the lab. Nursing care ongoing and safety maintained.

## 2019-02-26 NOTE — ED PROVIDER NOTE - PMH
Afib    [place] (hard of hearing)    HTN (hypertension)    Legally blind    Lung mass    Rib fracture    Stented coronary artery

## 2019-02-26 NOTE — ED PROVIDER NOTE - CARE PLAN
Principal Discharge DX:	Fall, initial encounter  Secondary Diagnosis:	Head injuries, initial encounter Principal Discharge DX:	Fall, initial encounter  Secondary Diagnosis:	Head injuries, initial encounter  Secondary Diagnosis:	Urinary tract infection without hematuria, site unspecified

## 2019-02-26 NOTE — ED ADULT NURSE NOTE - PMH
Afib    [place] (hard of hearing)    HTN (hypertension)    Legally blind    Lung mass    Rib fracture    Stented coronary artery

## 2019-02-26 NOTE — ED ADULT NURSE NOTE - NSFALLRSKINDICATORS_ED_ALL_ED
Call received from [facility], Eyal, stating that pt was calling them concerned that they were not able to fill her medications at her [facility]. Eyal stated that [facility] and was told that pt was trying to refill her proair too early as she was given a 90 day supply about 6 weeks ago. Eyal stated that on his end he only showed that a 30 day supply was dispensed but per pharmacy it was a 90 day supply. Eyal stated that pt is using her duobens already every 4-6 hours and needs her rescue inhaler to be able to get to her chemo appts. Writer will discuss with MD and will call pt back. Please advise.   yes

## 2019-02-26 NOTE — ED PROVIDER NOTE - MUSCULOSKELETAL, MLM
Spine appears normal, No spinal tend (c,t,l),. range of motion is not limited, no muscle or joint tenderness otherwise noted

## 2019-03-03 LAB
CULTURE RESULTS: SIGNIFICANT CHANGE UP
CULTURE RESULTS: SIGNIFICANT CHANGE UP
SPECIMEN SOURCE: SIGNIFICANT CHANGE UP
SPECIMEN SOURCE: SIGNIFICANT CHANGE UP

## 2020-06-23 NOTE — PHYSICAL THERAPY INITIAL EVALUATION ADULT - GAIT TRAINING, PT EVAL
Liquid Nitrogen Care Instructions    Freezing with liquid nitrogen is accompanied by a stinging, burning sensation which usually lasts from 15 minutes up to several hours.     It is normal for a blister to form at the treatment site, though you may have no blistering.  Occasionally there will be a blood blister.  The blister normally breaks in the first few days, but on the fingers it can last longer.      The blister may form into a dry crust.  The crust should peel off in 2-4 weeks.  There may be some mild redness after the crust falls off, but this should fade with time.     To care for the treated areas, follow these steps:    1. Gently wash, shampoo or shower the area once or twice daily, but the area should not be rubbed as this can irritate it.  Pat dry with a soft towel.     2. Apply Vaseline to the treated area at least twice daily.  You should apply Vaseline as often as needed to keep the areas moist.  This helps the spots heal.     3. You may apply a bandage if you wish, but this is usually not required unless the area is very crusty.      4. Continue care for the treated area following steps 1-3 until the area is completely healed.     Always wash your hands before touching or caring for the treated areas.      You can apply cosmetics to the area once the crusting or scabbing has healed.     Possible side effects of liquid nitrogen treatment include:  Permanent pigment change (lighter or darker skin), scar, blister, crusting, discomfort and infection.      Please call if you have any questions:    Dr. Nany Rand MD  [facility]  [phone]     (2 weeks) independent ambulation ~ 100 feet

## 2023-01-30 NOTE — ED PROVIDER NOTE - BOWEL SOUNDS
present x 4 quadrants Sotyktu Pregnancy And Lactation Text: There is insufficient data to evaluate whether or not Sotyktu is safe to use during pregnancy.   It is not known if Sotyktu passes into breast milk and whether or not it is safe to use when breastfeeding.

## 2023-10-25 NOTE — ED ADULT NURSE NOTE - NSSISCREENINGQ2_ED_A_ED
Mohs Case Number: [id number] Biopsy Photograph Reviewed: Yes Consent Type: Consent 1 (Standard) Eye Shield Used: No Initial Size Of Lesion: 1.3 X Size Of Lesion In Cm (Optional): 1.2 Number Of Stages: 1 Primary Defect Length In Cm (Final Defect Size - Required For Flaps/Grafts): 2.1 Primary Defect Width In Cm (Final Defect Size - Required For Flaps/Grafts): 1.8 Repair Type: Intermediate Layered Repair Oculoplastic Surgeon Procedure Text (A): After obtaining clear surgical margins the patient was sent to oculoplastics for surgical repair.  The patient understands they will receive post-surgical care and follow-up from the referring physician's office. Otolaryngologist Procedure Text (A): After obtaining clear surgical margins the patient was sent to otolaryngology for surgical repair.  The patient understands they will receive post-surgical care and follow-up from the referring physician's office. Plastic Surgeon Procedure Text (A): After obtaining clear surgical margins the patient was sent to plastics for surgical repair.  The patient understands they will receive post-surgical care and follow-up from the referring physician's office. Mid-Level Procedure Text (A): After obtaining clear surgical margins the patient was sent to a mid-level provider for surgical repair.  The patient understands they will receive post-surgical care and follow-up from the mid-level provider. Provider Procedure Text (A): After obtaining clear surgical margins the defect was repaired by another provider. Asc Procedure Text (A): After obtaining clear surgical margins the patient was sent to an ASC for surgical repair.  The patient understands they will receive post-surgical care and follow-up from the ASC physician. Simple / Intermediate / Complex Repair - Final Wound Length In Cm: 3.7 Suturegard Retention Suture: 2-0 Nylon Retention Suture Bite Size: 3 mm Length To Time In Minutes Device Was In Place: 10 Undermining Type: Entire Wound Debridement Text: The wound edges were debrided prior to proceeding with the closure to facilitate wound healing. Helical Rim Text: The closure involved the helical rim. Vermilion Border Text: The closure involved the vermilion border. Nostril Rim Text: The closure involved the nostril rim. Retention Suture Text: Retention sutures were placed to support the closure and prevent dehiscence. Secondary Defect Length In Cm (Required For Flaps): 0 Location Indication Override (Is Already Calculated Based On Selected Body Location): Area M Area H Indication Text: Tumors in this location are included in Area H (eyelids, eyebrows, nose, lips, chin, ear, pre-auricular, post-auricular, temple, genitalia, hands, feet, ankles and areola).  Tissue conservation is critical in these anatomic locations. Area M Indication Text: Tumors in this location are included in Area M (cheek, forehead, scalp, neck, jawline and pretibial skin).  Mohs surgery is indicated for tumors in these anatomic locations. Area L Indication Text: Tumors in this location are included in Area L (trunk and extremities).  Mohs surgery is indicated for larger tumors, or tumors with aggressive histologic features, in these anatomic locations. Tumor Debulked?: curette Depth Of Tumor Invasion (For Histology): tumor not visualized (deep and peripheral margins are clear of tumor) Perineural Invasion (For Histology - Be Specific If Possible): absent Stage 1: Number Of Blocks?: 2 Special Stains Stage 1 - Results: Base On Clearance Noted Above Stage 2: Additional Anesthesia Type: 1% lidocaine with epinephrine Staging Info: By selecting yes to the question above you will include information on AJCC 8 tumor staging in your Mohs note. Information on tumor staging will be automatically added for SCCs on the head and neck. AJCC 8 includes tumor size, tumor depth, perineural involvement and bone invasion. Tumor Depth: Less than 6mm from granular layer and no invasion beyond the subcutaneous fat Why Was The Change Made?: Please Select the Appropriate Response Medical Necessity Statement: Based on my medical judgement, Mohs surgery is the most appropriate treatment for this cancer compared to other treatments. Alternatives Discussed Intro (Do Not Add Period): I discussed alternative treatments to Mohs surgery and specifically discussed the risks and benefits of Consent 1/Introductory Paragraph: The rationale for Mohs was explained to the patient and consent was obtained. The risks, benefits and alternatives to therapy were discussed in detail. Specifically, the risks of infection, scarring, bleeding, prolonged wound healing, incomplete removal, allergy to anesthesia, nerve injury and recurrence were addressed. Prior to the procedure, the treatment site was clearly identified and confirmed by the patient. All components of Universal Protocol/PAUSE Rule completed. No Consent 2/Introductory Paragraph: Mohs surgery was explained to the patient and consent was obtained. The risks, benefits and alternatives to therapy were discussed in detail. Specifically, the risks of infection, scarring, bleeding, prolonged wound healing, incomplete removal, allergy to anesthesia, nerve injury and recurrence were addressed. Prior to the procedure, the treatment site was clearly identified and confirmed by the patient. All components of Universal Protocol/PAUSE Rule completed. Consent 3/Introductory Paragraph: I gave the patient a chance to ask questions they had about the procedure.  Following this I explained the Mohs procedure and consent was obtained. The risks, benefits and alternatives to therapy were discussed in detail. Specifically, the risks of infection, scarring, bleeding, prolonged wound healing, incomplete removal, allergy to anesthesia, nerve injury and recurrence were addressed. Prior to the procedure, the treatment site was clearly identified and confirmed by the patient. All components of Universal Protocol/PAUSE Rule completed. Consent (Temporal Branch)/Introductory Paragraph: The rationale for Mohs was explained to the patient and consent was obtained. The risks, benefits and alternatives to therapy were discussed in detail. Specifically, the risks of damage to the temporal branch of the facial nerve, infection, scarring, bleeding, prolonged wound healing, incomplete removal, allergy to anesthesia, and recurrence were addressed. Prior to the procedure, the treatment site was clearly identified and confirmed by the patient. All components of Universal Protocol/PAUSE Rule completed. Consent (Marginal Mandibular)/Introductory Paragraph: The rationale for Mohs was explained to the patient and consent was obtained. The risks, benefits and alternatives to therapy were discussed in detail. Specifically, the risks of damage to the marginal mandibular branch of the facial nerve, infection, scarring, bleeding, prolonged wound healing, incomplete removal, allergy to anesthesia, and recurrence were addressed. Prior to the procedure, the treatment site was clearly identified and confirmed by the patient. All components of Universal Protocol/PAUSE Rule completed. Consent (Spinal Accessory)/Introductory Paragraph: The rationale for Mohs was explained to the patient and consent was obtained. The risks, benefits and alternatives to therapy were discussed in detail. Specifically, the risks of damage to the spinal accessory nerve, infection, scarring, bleeding, prolonged wound healing, incomplete removal, allergy to anesthesia, and recurrence were addressed. Prior to the procedure, the treatment site was clearly identified and confirmed by the patient. All components of Universal Protocol/PAUSE Rule completed. Consent (Near Eyelid Margin)/Introductory Paragraph: The rationale for Mohs was explained to the patient and consent was obtained. The risks, benefits and alternatives to therapy were discussed in detail. Specifically, the risks of ectropion or eyelid deformity, infection, scarring, bleeding, prolonged wound healing, incomplete removal, allergy to anesthesia, nerve injury and recurrence were addressed. Prior to the procedure, the treatment site was clearly identified and confirmed by the patient. All components of Universal Protocol/PAUSE Rule completed. Consent (Ear)/Introductory Paragraph: The rationale for Mohs was explained to the patient and consent was obtained. The risks, benefits and alternatives to therapy were discussed in detail. Specifically, the risks of ear deformity, infection, scarring, bleeding, prolonged wound healing, incomplete removal, allergy to anesthesia, nerve injury and recurrence were addressed. Prior to the procedure, the treatment site was clearly identified and confirmed by the patient. All components of Universal Protocol/PAUSE Rule completed. Consent (Nose)/Introductory Paragraph: The rationale for Mohs was explained to the patient and consent was obtained. The risks, benefits and alternatives to therapy were discussed in detail. Specifically, the risks of nasal deformity, changes in the flow of air through the nose, infection, scarring, bleeding, prolonged wound healing, incomplete removal, allergy to anesthesia, nerve injury and recurrence were addressed. Prior to the procedure, the treatment site was clearly identified and confirmed by the patient. All components of Universal Protocol/PAUSE Rule completed. Consent (Lip)/Introductory Paragraph: The rationale for Mohs was explained to the patient and consent was obtained. The risks, benefits and alternatives to therapy were discussed in detail. Specifically, the risks of lip deformity, changes in the oral aperture, infection, scarring, bleeding, prolonged wound healing, incomplete removal, allergy to anesthesia, nerve injury and recurrence were addressed. Prior to the procedure, the treatment site was clearly identified and confirmed by the patient. All components of Universal Protocol/PAUSE Rule completed. Consent (Scalp)/Introductory Paragraph: The rationale for Mohs was explained to the patient and consent was obtained. The risks, benefits and alternatives to therapy were discussed in detail. Specifically, the risks of changes in hair growth pattern secondary to repair, infection, scarring, bleeding, prolonged wound healing, incomplete removal, allergy to anesthesia, nerve injury and recurrence were addressed. Prior to the procedure, the treatment site was clearly identified and confirmed by the patient. All components of Universal Protocol/PAUSE Rule completed. Detail Level: Detailed Postop Diagnosis: same Surgeon: Sebastian Whaley MD Anesthesia Type: 0.5% lidocaine, 0.25% Marcaine, 1:200,000 epinephrine and a 1:10 solution of sodium bicarbonate Anesthesia Volume In Cc: 3 Additional Anesthesia Volume In Cc: 6 Hemostasis: Electrodesiccation Estimated Blood Loss (Cc): minimal Repair Anesthesia Method: local infiltration Brow Lift Text: A midfrontal incision was made medially to the defect to allow access to the tissues just superior to the left eyebrow. Following careful dissection inferiorly in a supraperiosteal plane to the level of the left eyebrow, several 3-0 monocryl sutures were used to resuspend the eyebrow orbicularis oculi muscular unit to the superior frontal bone periosteum. This resulted in an appropriate reapproximation of static eyebrow symmetry and correction of the left brow ptosis. Deep Sutures: 4-0 Vicryl Epidermal Sutures: 5-0 Ethilon Epidermal Closure: running Suturegard Intro: Intraoperative tissue expansion was performed, utilizing the SUTUREGARD device, in order to reduce wound tension. Suturegard Body: The suture ends were repeatedly re-tightened and re-clamped to achieve the desired tissue expansion. Hemigard Intro: Due to skin fragility and wound tension, it was decided to use HEMIGARD adhesive retention suture devices to permit a linear closure. The skin was cleaned and dried for a 6cm distance away from the wound. Excessive hair, if present, was removed to allow for adhesion. Hemigard Postcare Instructions: The HEMIGARD strips are to remain completely dry for at least 5-7 days. Donor Site Anesthesia Type: same as repair anesthesia Graft Basting Suture (Optional): 5-0 Fast Absorbing Gut Graft Donor Site Bandage (Optional-Leave Blank If You Don't Want In Note): pressure bandage were applied to the donor site. Closure 2 Information: This tab is for additional flaps and grafts, including complex repair and grafts and complex repair and flaps. You can also specify a different location for the additional defect, if the location is the same you do not need to select a new one. We will insert the automated text for the repair you select below just as we do for solitary flaps and grafts. Please note that at this time if you select a location with a different insurance zone you will need to override the ICD10 and CPT if appropriate. Closure 3 Information: This tab is for additional flaps and grafts above and beyond our usual structured repairs.  Please note if you enter information here it will not currently bill and you will need to add the billing information manually. Wound Care: Petrolatum Dressing: pressure dressing Dressing (No Sutures): dry sterile dressing Suture Removal: 7 days Unna Boot Text: An Unna boot was placed to help immobilize the limb and facilitate more rapid healing. Home Suture Removal Text: Patient was provided instructions on removing sutures and will remove their sutures at home.  If they have any questions or difficulties they will call the office. Post-Care Instructions: I reviewed with the patient in detail post-care instructions. Patient is not to engage in any heavy lifting, exercise, or swimming for the next 14 days. Should the patient develop any fevers, chills, bleeding, severe pain patient will contact the office immediately. Pain Refusal Text: I offered to prescribe pain medication but the patient refused to take this medication. Mauc Instructions: By selecting yes to the question below the MAUC number will be added into the note.  This will be calculated automatically based on the diagnosis chosen, the size entered, the body zone selected (H,M,L) and the specific indications you chose. You will also have the option to override the Mohs AUC if you disagree with the automatically calculated number and this option is found in the Case Summary tab. Where Do You Want The Question To Include Opioid Counseling Located?: Case Summary Tab Eye Protection Verbiage: Before proceeding with the stage, a plastic scleral shield was inserted. The globe was anesthetized with a few drops of 1% lidocaine with 1:100,000 epinephrine. Then, an appropriate sized scleral shield was chosen and coated with lacrilube ointment. The shield was gently inserted and left in place for the duration of each stage. After the stage was completed, the shield was gently removed. Mohs Method Verbiage: An incision at a 45 degree angle following the standard Mohs approach was done and the specimen was harvested as a microscopic controlled layer. Surgeon/Pathologist Verbiage (Will Incorporate Name Of Surgeon From Intro If Not Blank): operated in two distinct and integrated capacities as the surgeon and pathologist. Mohs Histo Method Verbiage: Each section was then chromacoded and processed in the Mohs lab using the Mohs protocol and submitted for frozen section. Subsequent Stages Histo Method Verbiage: Using a similar technique to that described above, a thin layer of tissue was removed from all areas where tumor was visible on the previous stage.  The tissue was again oriented, mapped, dyed, and processed as above. Mohs Rapid Report Verbiage: The area of clinically evident tumor was marked with skin marking ink and appropriately hatched.  The initial incision was made following the Mohs approach through the skin.  The specimen was taken to the lab, divided into the necessary number of pieces, chromacoded and processed according to the Mohs protocol.  This was repeated in successive stages until a tumor free defect was achieved. Complex Repair Preamble Text (Leave Blank If You Do Not Want): Extensive wide undermining was performed. Intermediate Repair Preamble Text (Leave Blank If You Do Not Want): Undermining was performed with blunt dissection. Non-Graft Cartilage Fenestration Text: The cartilage was fenestrated with a 2mm punch biopsy to help facilitate healing. Graft Cartilage Fenestration Text: The cartilage was fenestrated with a 2mm punch biopsy to help facilitate graft survival and healing. Secondary Intention Text (Leave Blank If You Do Not Want): The defect will heal with secondary intention. No Repair - Repaired With Adjacent Surgical Defect Text (Leave Blank If You Do Not Want): After obtaining clear surgical margins the defect was repaired concurrently with another surgical defect which was in close approximation. Adjacent Tissue Transfer Text: The defect edges were debeveled with a #15 scalpel blade.  Given the location of the defect and the proximity to free margins an adjacent tissue transfer was deemed most appropriate.  Using a sterile surgical marker, an appropriate flap was drawn incorporating the defect and placing the expected incisions within the relaxed skin tension lines where possible.    The area thus outlined was incised deep to adipose tissue with a #15 scalpel blade.  The skin margins were undermined to an appropriate distance in all directions utilizing iris scissors. Advancement Flap (Single) Text: The defect edges were debeveled with a #15 scalpel blade.  Given the location of the defect and the proximity to free margins a single advancement flap was deemed most appropriate.  Using a sterile surgical marker, an appropriate advancement flap was drawn incorporating the defect and placing the expected incisions within the relaxed skin tension lines where possible.    The area thus outlined was incised deep to adipose tissue with a #15 scalpel blade.  The skin margins were undermined to an appropriate distance in all directions utilizing iris scissors. Advancement Flap (Double) Text: The defect edges were debeveled with a #15 scalpel blade.  Given the location of the defect and the proximity to free margins a double advancement flap was deemed most appropriate.  Using a sterile surgical marker, the appropriate advancement flaps were drawn incorporating the defect and placing the expected incisions within the relaxed skin tension lines where possible.    The area thus outlined was incised deep to adipose tissue with a #15 scalpel blade.  The skin margins were undermined to an appropriate distance in all directions utilizing iris scissors. Burow's Advancement Flap Text: The defect edges were debeveled with a #15 scalpel blade.  Given the location of the defect and the proximity to free margins a Burow's advancement flap was deemed most appropriate.  Using a sterile surgical marker, the appropriate advancement flap was drawn incorporating the defect and placing the expected incisions within the relaxed skin tension lines where possible.    The area thus outlined was incised deep to adipose tissue with a #15 scalpel blade.  The skin margins were undermined to an appropriate distance in all directions utilizing iris scissors. Chonodrocutaneous Helical Advancement Flap Text: The defect edges were debeveled with a #15 scalpel blade.  Given the location of the defect and the proximity to free margins a chondrocutaneous helical advancement flap was deemed most appropriate.  Using a sterile surgical marker, the appropriate advancement flap was drawn incorporating the defect and placing the expected incisions within the relaxed skin tension lines where possible.    The area thus outlined was incised deep to adipose tissue with a #15 scalpel blade.  The skin margins were undermined to an appropriate distance in all directions utilizing iris scissors. Crescentic Advancement Flap Text: The defect edges were debeveled with a #15 scalpel blade.  Given the location of the defect and the proximity to free margins a crescentic advancement flap was deemed most appropriate.  Using a sterile surgical marker, the appropriate advancement flap was drawn incorporating the defect and placing the expected incisions within the relaxed skin tension lines where possible.    The area thus outlined was incised deep to adipose tissue with a #15 scalpel blade.  The skin margins were undermined to an appropriate distance in all directions utilizing iris scissors. A-T Advancement Flap Text: The defect edges were debeveled with a #15 scalpel blade.  Given the location of the defect, shape of the defect and the proximity to free margins an A-T advancement flap was deemed most appropriate.  Using a sterile surgical marker, an appropriate advancement flap was drawn incorporating the defect and placing the expected incisions within the relaxed skin tension lines where possible.    The area thus outlined was incised deep to adipose tissue with a #15 scalpel blade.  The skin margins were undermined to an appropriate distance in all directions utilizing iris scissors. O-T Advancement Flap Text: The defect edges were debeveled with a #15 scalpel blade.  Given the location of the defect, shape of the defect and the proximity to free margins an O-T advancement flap was deemed most appropriate.  Using a sterile surgical marker, an appropriate advancement flap was drawn incorporating the defect and placing the expected incisions within the relaxed skin tension lines where possible.    The area thus outlined was incised deep to adipose tissue with a #15 scalpel blade.  The skin margins were undermined to an appropriate distance in all directions utilizing iris scissors. O-L Flap Text: The defect edges were debeveled with a #15 scalpel blade.  Given the location of the defect, shape of the defect and the proximity to free margins an O-L flap was deemed most appropriate.  Using a sterile surgical marker, an appropriate advancement flap was drawn incorporating the defect and placing the expected incisions within the relaxed skin tension lines where possible.    The area thus outlined was incised deep to adipose tissue with a #15 scalpel blade.  The skin margins were undermined to an appropriate distance in all directions utilizing iris scissors. O-Z Flap Text: The defect edges were debeveled with a #15 scalpel blade.  Given the location of the defect, shape of the defect and the proximity to free margins an O-Z flap was deemed most appropriate.  Using a sterile surgical marker, an appropriate transposition flap was drawn incorporating the defect and placing the expected incisions within the relaxed skin tension lines where possible. The area thus outlined was incised deep to adipose tissue with a #15 scalpel blade.  The skin margins were undermined to an appropriate distance in all directions utilizing iris scissors. Double O-Z Flap Text: The defect edges were debeveled with a #15 scalpel blade.  Given the location of the defect, shape of the defect and the proximity to free margins a Double O-Z flap was deemed most appropriate.  Using a sterile surgical marker, an appropriate transposition flap was drawn incorporating the defect and placing the expected incisions within the relaxed skin tension lines where possible. The area thus outlined was incised deep to adipose tissue with a #15 scalpel blade.  The skin margins were undermined to an appropriate distance in all directions utilizing iris scissors. V-Y Flap Text: The defect edges were debeveled with a #15 scalpel blade.  Given the location of the defect, shape of the defect and the proximity to free margins a V-Y flap was deemed most appropriate.  Using a sterile surgical marker, an appropriate advancement flap was drawn incorporating the defect and placing the expected incisions within the relaxed skin tension lines where possible.    The area thus outlined was incised deep to adipose tissue with a #15 scalpel blade.  The skin margins were undermined to an appropriate distance in all directions utilizing iris scissors. Advancement-Rotation Flap Text: The defect edges were debeveled with a #15 scalpel blade.  Given the location of the defect, shape of the defect and the proximity to free margins an advancement-rotation flap was deemed most appropriate.  Using a sterile surgical marker, an appropriate flap was drawn incorporating the defect and placing the expected incisions within the relaxed skin tension lines where possible. The area thus outlined was incised deep to adipose tissue with a #15 scalpel blade.  The skin margins were undermined to an appropriate distance in all directions utilizing iris scissors. Mercedes Flap Text: The defect edges were debeveled with a #15 scalpel blade.  Given the location of the defect, shape of the defect and the proximity to free margins a Mercedes flap was deemed most appropriate.  Using a sterile surgical marker, an appropriate advancement flap was drawn incorporating the defect and placing the expected incisions within the relaxed skin tension lines where possible. The area thus outlined was incised deep to adipose tissue with a #15 scalpel blade.  The skin margins were undermined to an appropriate distance in all directions utilizing iris scissors. Modified Advancement Flap Text: The defect edges were debeveled with a #15 scalpel blade.  Given the location of the defect, shape of the defect and the proximity to free margins a modified advancement flap was deemed most appropriate.  Using a sterile surgical marker, an appropriate advancement flap was drawn incorporating the defect and placing the expected incisions within the relaxed skin tension lines where possible.    The area thus outlined was incised deep to adipose tissue with a #15 scalpel blade.  The skin margins were undermined to an appropriate distance in all directions utilizing iris scissors. Mucosal Advancement Flap Text: Given the location of the defect, shape of the defect and the proximity to free margins a mucosal advancement flap was deemed most appropriate. Incisions were made with a 15 blade scalpel in the appropriate fashion along the cutaneous vermilion border and the mucosal lip. The remaining actinically damaged mucosal tissue was excised.  The mucosal advancement flap was then elevated to the gingival sulcus with care taken to preserve the neurovascular structures and advanced into the primary defect. Care was taken to ensure that precise realignment of the vermilion border was achieved. Peng Advancement Flap Text: The defect edges were debeveled with a #15 scalpel blade.  Given the location of the defect, shape of the defect and the proximity to free margins a Peng advancement flap was deemed most appropriate.  Using a sterile surgical marker, an appropriate advancement flap was drawn incorporating the defect and placing the expected incisions within the relaxed skin tension lines where possible. The area thus outlined was incised deep to adipose tissue with a #15 scalpel blade.  The skin margins were undermined to an appropriate distance in all directions utilizing iris scissors. Hatchet Flap Text: The defect edges were debeveled with a #15 scalpel blade.  Given the location of the defect, shape of the defect and the proximity to free margins a hatchet flap was deemed most appropriate.  Using a sterile surgical marker, an appropriate hatchet flap was drawn incorporating the defect and placing the expected incisions within the relaxed skin tension lines where possible.    The area thus outlined was incised deep to adipose tissue with a #15 scalpel blade.  The skin margins were undermined to an appropriate distance in all directions utilizing iris scissors. Rotation Flap Text: The defect edges were debeveled with a #15 scalpel blade.  Given the location of the defect, shape of the defect and the proximity to free margins a rotation flap was deemed most appropriate.  Using a sterile surgical marker, an appropriate rotation flap was drawn incorporating the defect and placing the expected incisions within the relaxed skin tension lines where possible.    The area thus outlined was incised deep to adipose tissue with a #15 scalpel blade.  The skin margins were undermined to an appropriate distance in all directions utilizing iris scissors. Bilateral Rotation Flap Text: The defect edges were debeveled with a #15 scalpel blade. Given the location of the defect, shape of the defect and the proximity to free margins a bilateral rotation flap was deemed most appropriate. Using a sterile surgical marker, an appropriate rotation flap was drawn incorporating the defect and placing the expected incisions within the relaxed skin tension lines where possible. The area thus outlined was incised deep to adipose tissue with a #15 scalpel blade. The skin margins were undermined to an appropriate distance in all directions utilizing iris scissors. Following this, the designed flap was carried over into the primary defect and sutured into place. Spiral Flap Text: The defect edges were debeveled with a #15 scalpel blade.  Given the location of the defect, shape of the defect and the proximity to free margins a spiral flap was deemed most appropriate.  Using a sterile surgical marker, an appropriate rotation flap was drawn incorporating the defect and placing the expected incisions within the relaxed skin tension lines where possible. The area thus outlined was incised deep to adipose tissue with a #15 scalpel blade.  The skin margins were undermined to an appropriate distance in all directions utilizing iris scissors. Staged Advancement Flap Text: The defect edges were debeveled with a #15 scalpel blade.  Given the location of the defect, shape of the defect and the proximity to free margins a staged advancement flap was deemed most appropriate.  Using a sterile surgical marker, an appropriate advancement flap was drawn incorporating the defect and placing the expected incisions within the relaxed skin tension lines where possible. The area thus outlined was incised deep to adipose tissue with a #15 scalpel blade.  The skin margins were undermined to an appropriate distance in all directions utilizing iris scissors. Star Wedge Flap Text: The defect edges were debeveled with a #15 scalpel blade.  Given the location of the defect, shape of the defect and the proximity to free margins a star wedge flap was deemed most appropriate.  Using a sterile surgical marker, an appropriate rotation flap was drawn incorporating the defect and placing the expected incisions within the relaxed skin tension lines where possible. The area thus outlined was incised deep to adipose tissue with a #15 scalpel blade.  The skin margins were undermined to an appropriate distance in all directions utilizing iris scissors. Transposition Flap Text: The defect edges were debeveled with a #15 scalpel blade.  Given the location of the defect and the proximity to free margins a transposition flap was deemed most appropriate.  Using a sterile surgical marker, an appropriate transposition flap was drawn incorporating the defect.    The area thus outlined was incised deep to adipose tissue with a #15 scalpel blade.  The skin margins were undermined to an appropriate distance in all directions utilizing iris scissors. Muscle Hinge Flap Text: The defect edges were debeveled with a #15 scalpel blade.  Given the size, depth and location of the defect and the proximity to free margins a muscle hinge flap was deemed most appropriate.  Using a sterile surgical marker, an appropriate hinge flap was drawn incorporating the defect. The area thus outlined was incised with a #15 scalpel blade.  The skin margins were undermined to an appropriate distance in all directions utilizing iris scissors. Mustarde Flap Text: The defect edges were debeveled with a #15 scalpel blade.  Given the size, depth and location of the defect and the proximity to free margins a Mustarde flap was deemed most appropriate.  Using a sterile surgical marker, an appropriate flap was drawn incorporating the defect. The area thus outlined was incised with a #15 scalpel blade.  The skin margins were undermined to an appropriate distance in all directions utilizing iris scissors. Nasal Turnover Hinge Flap Text: The defect edges were debeveled with a #15 scalpel blade.  Given the size, depth, location of the defect and the defect being full thickness a nasal turnover hinge flap was deemed most appropriate.  Using a sterile surgical marker, an appropriate hinge flap was drawn incorporating the defect. The area thus outlined was incised with a #15 scalpel blade. The flap was designed to recreate the nasal mucosal lining and the alar rim. The skin margins were undermined to an appropriate distance in all directions utilizing iris scissors. Nasalis-Muscle-Based Myocutaneous Island Pedicle Flap Text: Using a #15 blade, an incision was made around the donor flap to the level of the nasalis muscle. Wide lateral undermining was then performed in both the subcutaneous plane above the nasalis muscle, and in a submuscular plane just above periosteum. This allowed the formation of a free nasalis muscle axial pedicle (based on the angular artery) which was still attached to the actual cutaneous flap, increasing its mobility and vascular viability. Hemostasis was obtained with pinpoint electrocoagulation. The flap was mobilized into position and the pivotal anchor points positioned and stabilized with buried interrupted sutures. Subcutaneous and dermal tissues were closed in a multilayered fashion with sutures. Tissue redundancies were excised, and the epidermal edges were apposed without significant tension and sutured with sutures. Orbicularis Oris Muscle Flap Text: The defect edges were debeveled with a #15 scalpel blade.  Given that the defect affected the competency of the oral sphincter an orbicularis oris muscle flap was deemed most appropriate to restore this competency and normal muscle function.  Using a sterile surgical marker, an appropriate flap was drawn incorporating the defect. The area thus outlined was incised with a #15 scalpel blade. Melolabial Transposition Flap Text: The defect edges were debeveled with a #15 scalpel blade.  Given the location of the defect and the proximity to free margins a melolabial flap was deemed most appropriate.  Using a sterile surgical marker, an appropriate melolabial transposition flap was drawn incorporating the defect.    The area thus outlined was incised deep to adipose tissue with a #15 scalpel blade.  The skin margins were undermined to an appropriate distance in all directions utilizing iris scissors. Rhombic Flap Text: The defect edges were debeveled with a #15 scalpel blade.  Given the location of the defect and the proximity to free margins a rhombic flap was deemed most appropriate.  Using a sterile surgical marker, an appropriate rhombic flap was drawn incorporating the defect.    The area thus outlined was incised deep to adipose tissue with a #15 scalpel blade.  The skin margins were undermined to an appropriate distance in all directions utilizing iris scissors. Rhomboid Transposition Flap Text: The defect edges were debeveled with a #15 scalpel blade.  Given the location of the defect and the proximity to free margins a rhomboid transposition flap was deemed most appropriate.  Using a sterile surgical marker, an appropriate rhomboid flap was drawn incorporating the defect.    The area thus outlined was incised deep to adipose tissue with a #15 scalpel blade.  The skin margins were undermined to an appropriate distance in all directions utilizing iris scissors. Bi-Rhombic Flap Text: The defect edges were debeveled with a #15 scalpel blade.  Given the location of the defect and the proximity to free margins a bi-rhombic flap was deemed most appropriate.  Using a sterile surgical marker, an appropriate rhombic flap was drawn incorporating the defect. The area thus outlined was incised deep to adipose tissue with a #15 scalpel blade.  The skin margins were undermined to an appropriate distance in all directions utilizing iris scissors. Helical Rim Advancement Flap Text: The defect edges were debeveled with a #15 blade scalpel.  Given the location of the defect and the proximity to free margins (helical rim) a double helical rim advancement flap was deemed most appropriate.  Using a sterile surgical marker, the appropriate advancement flaps were drawn incorporating the defect and placing the expected incisions between the helical rim and antihelix where possible.  The area thus outlined was incised through and through with a #15 scalpel blade.  With a skin hook and iris scissors, the flaps were gently and sharply undermined and freed up. Bilateral Helical Rim Advancement Flap Text: The defect edges were debeveled with a #15 blade scalpel.  Given the location of the defect and the proximity to free margins (helical rim) a bilateral helical rim advancement flap was deemed most appropriate.  Using a sterile surgical marker, the appropriate advancement flaps were drawn incorporating the defect and placing the expected incisions between the helical rim and antihelix where possible.  The area thus outlined was incised through and through with a #15 scalpel blade.  With a skin hook and iris scissors, the flaps were gently and sharply undermined and freed up. Ear Star Wedge Flap Text: The defect edges were debeveled with a #15 blade scalpel.  Given the location of the defect and the proximity to free margins (helical rim) an ear star wedge flap was deemed most appropriate.  Using a sterile surgical marker, the appropriate flap was drawn incorporating the defect and placing the expected incisions between the helical rim and antihelix where possible.  The area thus outlined was incised through and through with a #15 scalpel blade. Banner Transposition Flap Text: The defect edges were debeveled with a #15 scalpel blade.  Given the location of the defect and the proximity to free margins a Banner transposition flap was deemed most appropriate.  Using a sterile surgical marker, an appropriate flap drawn around the defect. The area thus outlined was incised deep to adipose tissue with a #15 scalpel blade.  The skin margins were undermined to an appropriate distance in all directions utilizing iris scissors. Bilobed Flap Text: The defect edges were debeveled with a #15 scalpel blade.  Given the location of the defect and the proximity to free margins a bilobe flap was deemed most appropriate.  Using a sterile surgical marker, an appropriate bilobe flap drawn around the defect.    The area thus outlined was incised deep to adipose tissue with a #15 scalpel blade.  The skin margins were undermined to an appropriate distance in all directions utilizing iris scissors. Bilobed Transposition Flap Text: The defect edges were debeveled with a #15 scalpel blade.  Given the location of the defect and the proximity to free margins a bilobed transposition flap was deemed most appropriate.  Using a sterile surgical marker, an appropriate bilobe flap drawn around the defect.    The area thus outlined was incised deep to adipose tissue with a #15 scalpel blade.  The skin margins were undermined to an appropriate distance in all directions utilizing iris scissors. Trilobed Flap Text: The defect edges were debeveled with a #15 scalpel blade.  Given the location of the defect and the proximity to free margins a trilobed flap was deemed most appropriate.  Using a sterile surgical marker, an appropriate trilobed flap drawn around the defect.    The area thus outlined was incised deep to adipose tissue with a #15 scalpel blade.  The skin margins were undermined to an appropriate distance in all directions utilizing iris scissors. Dorsal Nasal Flap Text: The defect edges were debeveled with a #15 scalpel blade.  Given the location of the defect and the proximity to free margins a dorsal nasal flap was deemed most appropriate.  Using a sterile surgical marker, an appropriate dorsal nasal flap was drawn around the defect.    The area thus outlined was incised deep to adipose tissue with a #15 scalpel blade.  The skin margins were undermined to an appropriate distance in all directions utilizing iris scissors. Island Pedicle Flap Text: The defect edges were debeveled with a #15 scalpel blade.  Given the location of the defect, shape of the defect and the proximity to free margins an island pedicle advancement flap was deemed most appropriate.  Using a sterile surgical marker, an appropriate advancement flap was drawn incorporating the defect, outlining the appropriate donor tissue and placing the expected incisions within the relaxed skin tension lines where possible.    The area thus outlined was incised deep to adipose tissue with a #15 scalpel blade.  The skin margins were undermined to an appropriate distance in all directions around the primary defect and laterally outward around the island pedicle utilizing iris scissors.  There was minimal undermining beneath the pedicle flap. Island Pedicle Flap With Canthal Suspension Text: The defect edges were debeveled with a #15 scalpel blade.  Given the location of the defect, shape of the defect and the proximity to free margins an island pedicle advancement flap was deemed most appropriate.  Using a sterile surgical marker, an appropriate advancement flap was drawn incorporating the defect, outlining the appropriate donor tissue and placing the expected incisions within the relaxed skin tension lines where possible. The area thus outlined was incised deep to adipose tissue with a #15 scalpel blade.  The skin margins were undermined to an appropriate distance in all directions around the primary defect and laterally outward around the island pedicle utilizing iris scissors.  There was minimal undermining beneath the pedicle flap. A suspension suture was placed in the canthal tendon to prevent tension and prevent ectropion. Alar Island Pedicle Flap Text: The defect edges were debeveled with a #15 scalpel blade.  Given the location of the defect, shape of the defect and the proximity to the alar rim an island pedicle advancement flap was deemed most appropriate.  Using a sterile surgical marker, an appropriate advancement flap was drawn incorporating the defect, outlining the appropriate donor tissue and placing the expected incisions within the nasal ala running parallel to the alar rim. The area thus outlined was incised with a #15 scalpel blade.  The skin margins were undermined minimally to an appropriate distance in all directions around the primary defect and laterally outward around the island pedicle utilizing iris scissors.  There was minimal undermining beneath the pedicle flap. Double Island Pedicle Flap Text: The defect edges were debeveled with a #15 scalpel blade.  Given the location of the defect, shape of the defect and the proximity to free margins a double island pedicle advancement flap was deemed most appropriate.  Using a sterile surgical marker, an appropriate advancement flap was drawn incorporating the defect, outlining the appropriate donor tissue and placing the expected incisions within the relaxed skin tension lines where possible.    The area thus outlined was incised deep to adipose tissue with a #15 scalpel blade.  The skin margins were undermined to an appropriate distance in all directions around the primary defect and laterally outward around the island pedicle utilizing iris scissors.  There was minimal undermining beneath the pedicle flap. Island Pedicle Flap-Requiring Vessel Identification Text: The defect edges were debeveled with a #15 scalpel blade.  Given the location of the defect, shape of the defect and the proximity to free margins an island pedicle advancement flap was deemed most appropriate.  Using a sterile surgical marker, an appropriate advancement flap was drawn, based on the axial vessel mentioned above, incorporating the defect, outlining the appropriate donor tissue and placing the expected incisions within the relaxed skin tension lines where possible.    The area thus outlined was incised deep to adipose tissue with a #15 scalpel blade.  The skin margins were undermined to an appropriate distance in all directions around the primary defect and laterally outward around the island pedicle utilizing iris scissors.  There was minimal undermining beneath the pedicle flap. Keystone Flap Text: The defect edges were debeveled with a #15 scalpel blade.  Given the location of the defect, shape of the defect a keystone flap was deemed most appropriate.  Using a sterile surgical marker, an appropriate keystone flap was drawn incorporating the defect, outlining the appropriate donor tissue and placing the expected incisions within the relaxed skin tension lines where possible. The area thus outlined was incised deep to adipose tissue with a #15 scalpel blade.  The skin margins were undermined to an appropriate distance in all directions around the primary defect and laterally outward around the flap utilizing iris scissors. O-T Plasty Text: The defect edges were debeveled with a #15 scalpel blade.  Given the location of the defect, shape of the defect and the proximity to free margins an O-T plasty was deemed most appropriate.  Using a sterile surgical marker, an appropriate O-T plasty was drawn incorporating the defect and placing the expected incisions within the relaxed skin tension lines where possible.    The area thus outlined was incised deep to adipose tissue with a #15 scalpel blade.  The skin margins were undermined to an appropriate distance in all directions utilizing iris scissors. O-Z Plasty Text: The defect edges were debeveled with a #15 scalpel blade.  Given the location of the defect, shape of the defect and the proximity to free margins an O-Z plasty (double transposition flap) was deemed most appropriate.  Using a sterile surgical marker, the appropriate transposition flaps were drawn incorporating the defect and placing the expected incisions within the relaxed skin tension lines where possible.    The area thus outlined was incised deep to adipose tissue with a #15 scalpel blade.  The skin margins were undermined to an appropriate distance in all directions utilizing iris scissors.  Hemostasis was achieved with electrocautery.  The flaps were then transposed into place, one clockwise and the other counterclockwise, and anchored with interrupted buried subcutaneous sutures. Double O-Z Plasty Text: The defect edges were debeveled with a #15 scalpel blade.  Given the location of the defect, shape of the defect and the proximity to free margins a Double O-Z plasty (double transposition flap) was deemed most appropriate.  Using a sterile surgical marker, the appropriate transposition flaps were drawn incorporating the defect and placing the expected incisions within the relaxed skin tension lines where possible. The area thus outlined was incised deep to adipose tissue with a #15 scalpel blade.  The skin margins were undermined to an appropriate distance in all directions utilizing iris scissors.  Hemostasis was achieved with electrocautery.  The flaps were then transposed into place, one clockwise and the other counterclockwise, and anchored with interrupted buried subcutaneous sutures. V-Y Plasty Text: The defect edges were debeveled with a #15 scalpel blade.  Given the location of the defect, shape of the defect and the proximity to free margins an V-Y advancement flap was deemed most appropriate.  Using a sterile surgical marker, an appropriate advancement flap was drawn incorporating the defect and placing the expected incisions within the relaxed skin tension lines where possible.    The area thus outlined was incised deep to adipose tissue with a #15 scalpel blade.  The skin margins were undermined to an appropriate distance in all directions utilizing iris scissors. H Plasty Text: Given the location of the defect, shape of the defect and the proximity to free margins a H-plasty was deemed most appropriate for repair.  Using a sterile surgical marker, the appropriate advancement arms of the H-plasty were drawn incorporating the defect and placing the expected incisions within the relaxed skin tension lines where possible. The area thus outlined was incised deep to adipose tissue with a #15 scalpel blade. The skin margins were undermined to an appropriate distance in all directions utilizing iris scissors.  The opposing advancement arms were then advanced into place in opposite direction and anchored with interrupted buried subcutaneous sutures. W Plasty Text: The lesion was extirpated to the level of the fat with a #15 scalpel blade.  Given the location of the defect, shape of the defect and the proximity to free margins a W-plasty was deemed most appropriate for repair.  Using a sterile surgical marker, the appropriate transposition arms of the W-plasty were drawn incorporating the defect and placing the expected incisions within the relaxed skin tension lines where possible.    The area thus outlined was incised deep to adipose tissue with a #15 scalpel blade.  The skin margins were undermined to an appropriate distance in all directions utilizing iris scissors.  The opposing transposition arms were then transposed into place in opposite direction and anchored with interrupted buried subcutaneous sutures. Z Plasty Text: The lesion was extirpated to the level of the fat with a #15 scalpel blade.  Given the location of the defect, shape of the defect and the proximity to free margins a Z-plasty was deemed most appropriate for repair.  Using a sterile surgical marker, the appropriate transposition arms of the Z-plasty were drawn incorporating the defect and placing the expected incisions within the relaxed skin tension lines where possible.    The area thus outlined was incised deep to adipose tissue with a #15 scalpel blade.  The skin margins were undermined to an appropriate distance in all directions utilizing iris scissors.  The opposing transposition arms were then transposed into place in opposite direction and anchored with interrupted buried subcutaneous sutures. Double Z Plasty Text: The lesion was extirpated to the level of the fat with a #15 scalpel blade. Given the location of the defect, shape of the defect and the proximity to free margins a double Z-plasty was deemed most appropriate for repair. Using a sterile surgical marker, the appropriate transposition arms of the double Z-plasty were drawn incorporating the defect and placing the expected incisions within the relaxed skin tension lines where possible. The area thus outlined was incised deep to adipose tissue with a #15 scalpel blade. The skin margins were undermined to an appropriate distance in all directions utilizing iris scissors. The opposing transposition arms were then transposed and carried over into place in opposite direction and anchored with interrupted buried subcutaneous sutures. Zygomaticofacial Flap Text: Given the location of the defect, shape of the defect and the proximity to free margins a zygomaticofacial flap was deemed most appropriate for repair.  Using a sterile surgical marker, the appropriate flap was drawn incorporating the defect and placing the expected incisions within the relaxed skin tension lines where possible. The area thus outlined was incised deep to adipose tissue with a #15 scalpel blade with preservation of a vascular pedicle.  The skin margins were undermined to an appropriate distance in all directions utilizing iris scissors.  The flap was then placed into the defect and anchored with interrupted buried subcutaneous sutures. Cheek Interpolation Flap Text: A decision was made to reconstruct the defect utilizing an interpolation axial flap and a staged reconstruction.  A telfa template was made of the defect.  This telfa template was then used to outline the Cheek Interpolation flap.  The donor area for the pedicle flap was then injected with anesthesia.  The flap was excised through the skin and subcutaneous tissue down to the layer of the underlying musculature.  The interpolation flap was carefully excised within this deep plane to maintain its blood supply.  The edges of the donor site were undermined.   The donor site was closed in a primary fashion.  The pedicle was then rotated into position and sutured.  Once the tube was sutured into place, adequate blood supply was confirmed with blanching and refill.  The pedicle was then wrapped with xeroform gauze and dressed appropriately with a telfa and gauze bandage to ensure continued blood supply and protect the attached pedicle. Cheek-To-Nose Interpolation Flap Text: A decision was made to reconstruct the defect utilizing an interpolation axial flap and a staged reconstruction.  A telfa template was made of the defect.  This telfa template was then used to outline the Cheek-To-Nose Interpolation flap.  The donor area for the pedicle flap was then injected with anesthesia.  The flap was excised through the skin and subcutaneous tissue down to the layer of the underlying musculature.  The interpolation flap was carefully excised within this deep plane to maintain its blood supply.  The edges of the donor site were undermined.   The donor site was closed in a primary fashion.  The pedicle was then rotated into position and sutured.  Once the tube was sutured into place, adequate blood supply was confirmed with blanching and refill.  The pedicle was then wrapped with xeroform gauze and dressed appropriately with a telfa and gauze bandage to ensure continued blood supply and protect the attached pedicle. Interpolation Flap Text: A decision was made to reconstruct the defect utilizing an interpolation axial flap and a staged reconstruction.  A telfa template was made of the defect.  This telfa template was then used to outline the interpolation flap.  The donor area for the pedicle flap was then injected with anesthesia.  The flap was excised through the skin and subcutaneous tissue down to the layer of the underlying musculature.  The interpolation flap was carefully excised within this deep plane to maintain its blood supply.  The edges of the donor site were undermined.   The donor site was closed in a primary fashion.  The pedicle was then rotated into position and sutured.  Once the tube was sutured into place, adequate blood supply was confirmed with blanching and refill.  The pedicle was then wrapped with xeroform gauze and dressed appropriately with a telfa and gauze bandage to ensure continued blood supply and protect the attached pedicle. Melolabial Interpolation Flap Text: A decision was made to reconstruct the defect utilizing an interpolation axial flap and a staged reconstruction.  A telfa template was made of the defect.  This telfa template was then used to outline the melolabial interpolation flap.  The donor area for the pedicle flap was then injected with anesthesia.  The flap was excised through the skin and subcutaneous tissue down to the layer of the underlying musculature.  The pedicle flap was carefully excised within this deep plane to maintain its blood supply.  The edges of the donor site were undermined.   The donor site was closed in a primary fashion.  The pedicle was then rotated into position and sutured.  Once the tube was sutured into place, adequate blood supply was confirmed with blanching and refill.  The pedicle was then wrapped with xeroform gauze and dressed appropriately with a telfa and gauze bandage to ensure continued blood supply and protect the attached pedicle. Mastoid Interpolation Flap Text: A decision was made to reconstruct the defect utilizing an interpolation axial flap and a staged reconstruction.  A telfa template was made of the defect.  This telfa template was then used to outline the mastoid interpolation flap.  The donor area for the pedicle flap was then injected with anesthesia.  The flap was excised through the skin and subcutaneous tissue down to the layer of the underlying musculature.  The pedicle flap was carefully excised within this deep plane to maintain its blood supply.  The edges of the donor site were undermined.   The donor site was closed in a primary fashion.  The pedicle was then rotated into position and sutured.  Once the tube was sutured into place, adequate blood supply was confirmed with blanching and refill.  The pedicle was then wrapped with xeroform gauze and dressed appropriately with a telfa and gauze bandage to ensure continued blood supply and protect the attached pedicle. Posterior Auricular Interpolation Flap Text: A decision was made to reconstruct the defect utilizing an interpolation axial flap and a staged reconstruction.  A telfa template was made of the defect.  This telfa template was then used to outline the posterior auricular interpolation flap.  The donor area for the pedicle flap was then injected with anesthesia.  The flap was excised through the skin and subcutaneous tissue down to the layer of the underlying musculature.  The pedicle flap was carefully excised within this deep plane to maintain its blood supply.  The edges of the donor site were undermined.   The donor site was closed in a primary fashion.  The pedicle was then rotated into position and sutured.  Once the tube was sutured into place, adequate blood supply was confirmed with blanching and refill.  The pedicle was then wrapped with xeroform gauze and dressed appropriately with a telfa and gauze bandage to ensure continued blood supply and protect the attached pedicle. Paramedian Forehead Flap Text: A decision was made to reconstruct the defect utilizing an interpolation axial flap and a staged reconstruction.  A telfa template was made of the defect.  This telfa template was then used to outline the paramedian forehead pedicle flap.  The donor area for the pedicle flap was then injected with anesthesia.  The flap was excised through the skin and subcutaneous tissue down to the layer of the underlying musculature.  The pedicle flap was carefully excised within this deep plane to maintain its blood supply.  The edges of the donor site were undermined.   The donor site was closed in a primary fashion.  The pedicle was then rotated into position and sutured.  Once the tube was sutured into place, adequate blood supply was confirmed with blanching and refill.  The pedicle was then wrapped with xeroform gauze and dressed appropriately with a telfa and gauze bandage to ensure continued blood supply and protect the attached pedicle. Abbe Flap (Upper To Lower Lip) Text: The defect of the lower lip was assessed and measured.  Given the location and size of the defect, an Abbe flap was deemed most appropriate.  Using a sterile surgical marker, an appropriate Abbe flap was measured and drawn on the upper lip. Local anesthesia was then infiltrated.  A scalpel was then used to incise the upper lip through and through the skin, vermilion, muscle and mucosa, leaving the flap pedicled on the opposite side.  The flap was then rotated and transferred to the lower lip defect.  The flap was then sutured into place with a three layer technique, closing the orbicularis oris muscle layer with subcutaneous buried sutures, followed by a mucosal layer and an epidermal layer. Abbe Flap (Lower To Upper Lip) Text: The defect of the upper lip was assessed and measured.  Given the location and size of the defect, an Abbe flap was deemed most appropriate.  Using a sterile surgical marker, an appropriate Abbe flap was measured and drawn on the lower lip. Local anesthesia was then infiltrated. A scalpel was then used to incise the upper lip through and through the skin, vermilion, muscle and mucosa, leaving the flap pedicled on the opposite side.  The flap was then rotated and transferred to the lower lip defect.  The flap was then sutured into place with a three layer technique, closing the orbicularis oris muscle layer with subcutaneous buried sutures, followed by a mucosal layer and an epidermal layer. Estlander Flap (Upper To Lower Lip) Text: The defect of the lower lip was assessed and measured.  Given the location and size of the defect, an Estlander flap was deemed most appropriate.  Using a sterile surgical marker, an appropriate Estlander flap was measured and drawn on the upper lip. Local anesthesia was then infiltrated. A scalpel was then used to incise the lateral aspect of the flap, through skin, muscle and mucosa, leaving the flap pedicled medially.  The flap was then rotated and positioned to fill the lower lip defect.  The flap was then sutured into place with a three layer technique, closing the orbicularis oris muscle layer with subcutaneous buried sutures, followed by a mucosal layer and an epidermal layer. Cheiloplasty (Less Than 50%) Text: A decision was made to reconstruct the defect with a  cheiloplasty.  The defect was undermined extensively.  Additional orbicularis oris muscle was excised with a 15 blade scalpel.  The defect was converted into a full thickness wedge, of less than 50% of the vertical height of the lip, to facilite a better cosmetic result.  Small vessels were then tied off with 5-0 monocyrl. The orbicularis oris, superficial fascia, adipose and dermis were then reapproximated.  After the deeper layers were approximated the epidermis was reapproximated with particular care given to realign the vermilion border. Cheiloplasty (Complex) Text: A decision was made to reconstruct the defect with a  cheiloplasty.  The defect was undermined extensively.  Additional orbicularis oris muscle was excised with a 15 blade scalpel.  The defect was converted into a full thickness wedge to facilite a better cosmetic result.  Small vessels were then tied off with 5-0 monocyrl. The orbicularis oris, superficial fascia, adipose and dermis were then reapproximated.  After the deeper layers were approximated the epidermis was reapproximated with particular care given to realign the vermilion border. Ear Wedge Repair Text: A wedge excision was completed by carrying down an excision through the full thickness of the ear and cartilage with an inward facing Burow's triangle. The wound was then closed in a layered fashion. Full Thickness Lip Wedge Repair (Flap) Text: Given the location of the defect and the proximity to free margins a full thickness wedge repair was deemed most appropriate.  Using a sterile surgical marker, the appropriate repair was drawn incorporating the defect and placing the expected incisions perpendicular to the vermilion border.  The vermilion border was also meticulously outlined to ensure appropriate reapproximation during the repair.  The area thus outlined was incised through and through with a #15 scalpel blade.  The muscularis and dermis were reaproximated with deep sutures following hemostasis. Care was taken to realign the vermilion border before proceeding with the superficial closure.  Once the vermilion was realigned the superfical and mucosal closure was finished. Ftsg Text: The defect edges were debeveled with a #15 scalpel blade.  Given the location of the defect, shape of the defect and the proximity to free margins a full thickness skin graft was deemed most appropriate.  Using a sterile surgical marker, the primary defect shape was transferred to the donor site. The area thus outlined was incised deep to adipose tissue with a #15 scalpel blade.  The harvested graft was then trimmed of adipose tissue until only dermis and epidermis was left.  The skin margins of the secondary defect were undermined to an appropriate distance in all directions utilizing iris scissors.  The secondary defect was closed with interrupted buried subcutaneous sutures.  The skin edges were then re-apposed with running  sutures.  The skin graft was then placed in the primary defect and oriented appropriately. Split-Thickness Skin Graft Text: The defect edges were debeveled with a #15 scalpel blade.  Given the location of the defect, shape of the defect and the proximity to free margins a split thickness skin graft was deemed most appropriate.  Using a sterile surgical marker, the primary defect shape was transferred to the donor site. The split thickness graft was then harvested.  The skin graft was then placed in the primary defect and oriented appropriately. Pinch Graft Text: The defect edges were debeveled with a #15 scalpel blade. Given the location of the defect and shape of the defect a a pinch graft was deemed most appropriate. Using a sterile surgical marker, the primary defect shape was transferred to the donor site. Multiple 4 mm pinch grafts was incised to superficial dermis with a #15 scalpel blade.    The pinch skin graft was then placed in the primary defect and oriented appropriately. Burow's Graft Text: The defect edges were debeveled with a #15 scalpel blade.  Given the location of the defect, shape of the defect, the proximity to free margins and the presence of a standing cone deformity a Burow's skin graft was deemed most appropriate. The standing cone was removed and this tissue was then trimmed to the shape of the primary defect. The adipose tissue was also removed until only dermis and epidermis were left.  The skin margins of the secondary defect were undermined to an appropriate distance in all directions utilizing iris scissors.  The secondary defect was closed with interrupted buried subcutaneous sutures.  The skin edges were then re-apposed with running  sutures.  The skin graft was then placed in the primary defect and oriented appropriately. Cartilage Graft Text: The defect edges were debeveled with a #15 scalpel blade.  Given the location of the defect, shape of the defect, the fact the defect involved a full thickness cartilage defect a cartilage graft was deemed most appropriate.  An appropriate donor site was identified, cleansed, and anesthetized. The cartilage graft was then harvested and transferred to the recipient site, oriented appropriately and then sutured into place.  The secondary defect was then repaired using a primary closure. Composite Graft Text: The defect edges were debeveled with a #15 scalpel blade.  Given the location of the defect, shape of the defect, the proximity to free margins and the fact the defect was full thickness a composite graft was deemed most appropriate.  The defect was outline and then transferred to the donor site.  A full thickness graft was then excised from the donor site. The graft was then placed in the primary defect, oriented appropriately and then sutured into place.  The secondary defect was then repaired using a primary closure. Epidermal Autograft Text: The defect edges were debeveled with a #15 scalpel blade.  Given the location of the defect, shape of the defect and the proximity to free margins an epidermal autograft was deemed most appropriate.  Using a sterile surgical marker, the primary defect shape was transferred to the donor site. The epidermal graft was then harvested.  The skin graft was then placed in the primary defect and oriented appropriately. Dermal Autograft Text: The defect edges were debeveled with a #15 scalpel blade.  Given the location of the defect, shape of the defect and the proximity to free margins a dermal autograft was deemed most appropriate.  Using a sterile surgical marker, the primary defect shape was transferred to the donor site. The area thus outlined was incised deep to adipose tissue with a #15 scalpel blade.  The harvested graft was then trimmed of adipose and epidermal tissue until only dermis was left.  The skin graft was then placed in the primary defect and oriented appropriately. Skin Substitute Text: The defect edges were debeveled with a #15 scalpel blade.  Given the location of the defect, shape of the defect and the proximity to free margins a skin substitute graft was deemed most appropriate.  The graft material was trimmed to fit the size of the defect. The graft was then placed in the primary defect and oriented appropriately. Tissue Cultured Epidermal Autograft Text: The defect edges were debeveled with a #15 scalpel blade.  Given the location of the defect, shape of the defect and the proximity to free margins a tissue cultured epidermal autograft was deemed most appropriate.  The graft was then trimmed to fit the size of the defect.  The graft was then placed in the primary defect and oriented appropriately. Xenograft Text: The defect edges were debeveled with a #15 scalpel blade.  Given the location of the defect, shape of the defect and the proximity to free margins a xenograft was deemed most appropriate.  The graft was then trimmed to fit the size of the defect.  The graft was then placed in the primary defect and oriented appropriately. Purse String (Simple) Text: Given the location of the defect and the characteristics of the surrounding skin a purse string closure was deemed most appropriate.  Undermining was performed circumfirentially around the surgical defect.  A purse string suture was then placed and tightened. Purse String (Intermediate) Text: Given the location of the defect and the characteristics of the surrounding skin a purse string intermediate closure was deemed most appropriate.  Undermining was performed circumfirentially around the surgical defect.  A purse string suture was then placed and tightened. Partial Purse String (Simple) Text: Given the location of the defect and the characteristics of the surrounding skin a simple purse string closure was deemed most appropriate.  Undermining was performed circumfirentially around the surgical defect.  A purse string suture was then placed and tightened. Wound tension only allowed a partial closure of the circular defect. Partial Purse String (Intermediate) Text: Given the location of the defect and the characteristics of the surrounding skin an intermediate purse string closure was deemed most appropriate.  Undermining was performed circumfirentially around the surgical defect.  A purse string suture was then placed and tightened. Wound tension only allowed a partial closure of the circular defect. Localized Dermabrasion With Wire Brush Text: The patient was draped in routine manner.  Localized dermabrasion using 3 x 17 mm wire brush was performed in routine manner to papillary dermis. This spot dermabrasion is being performed to complete skin cancer reconstruction. It also will eliminate the other sun damaged precancerous cells that are known to be part of the regional effect of a lifetime's worth of sun exposure. This localized dermabrasion is therapeutic and should not be considered cosmetic in any regard. Tarsorrhaphy Text: A tarsorrhaphy was performed using Frost sutures. Intermediate Repair And Flap Additional Text (Will Appearing After The Standard Complex Repair Text): The intermediate repair was not sufficient to completely close the primary defect. The remaining additional defect was repaired with the flap mentioned below. Intermediate Repair And Graft Additional Text (Will Appearing After The Standard Complex Repair Text): The intermediate repair was not sufficient to completely close the primary defect. The remaining additional defect was repaired with the graft mentioned below. Complex Repair And Flap Additional Text (Will Appearing After The Standard Complex Repair Text): The complex repair was not sufficient to completely close the primary defect. The remaining additional defect was repaired with the flap mentioned below. Complex Repair And Graft Additional Text (Will Appearing After The Standard Complex Repair Text): The complex repair was not sufficient to completely close the primary defect. The remaining additional defect was repaired with the graft mentioned below. Manual Repair Warning Statement: We plan on removing the manually selected variable below in favor of our much easier automatic structured text blocks found in the previous tab. We decided to do this to help make the flow better and give you the full power of structured data. Manual selection is never going to be ideal in our platform and I would encourage you to avoid using manual selection from this point on, especially since I will be sunsetting this feature. It is important that you do one of two things with the customized text below. First, you can save all of the text in a word file so you can have it for future reference. Second, transfer the text to the appropriate area in the Library tab. Lastly, if there is a flap or graft type which we do not have you need to let us know right away so I can add it in before the variable is hidden. No need to panic, we plan to give you roughly 6 months to make the change. Same Histology In Subsequent Stages Text: The pattern and morphology of the tumor is as described in the first stage. No Residual Tumor Seen Histology Text: There were no malignant cells seen in the sections examined. Inflammation Suggestive Of Cancer Camouflage Histology Text: There was a dense lymphocytic infiltrate which prevented adequate histologic evaluation of adjacent structures. Bcc Histology Text: There were numerous aggregates of basaloid cells. Bcc Infiltrative Histology Text: There were numerous aggregates of basaloid cells demonstrating an infiltrative pattern. Mart-1 - Positive Histology Text: MART-1 staining demonstrates areas of higher density and clustering of melanocytes with Pagetoid spread upwards within the epidermis. The surgical margins are positive for tumor cells. Mart-1 - Negative Histology Text: MART-1 staining demonstrates a normal density and pattern of melanocytes along the dermal-epidermal junction. The surgical margins are negative for tumor cells. ia Id #: [id number] Information: Selecting Yes will display possible errors in your note based on the variables you have selected. This validation is only offered as a suggestion for you. PLEASE NOTE THAT THE VALIDATION TEXT WILL BE REMOVED WHEN YOU FINALIZE YOUR NOTE. IF YOU WANT TO FAX A PRELIMINARY NOTE YOU WILL NEED TO TOGGLE THIS TO 'NO' IF YOU DO NOT WANT IT IN YOUR FAXED NOTE.

## 2024-09-09 NOTE — DISCHARGE NOTE ADULT - CARE PLAN
DISPLAY PLAN FREE TEXT
Principal Discharge DX:	Fall in home, initial encounter  Goal:	.  Assessment and plan of treatment:	PT as per [facility]  Secondary Diagnosis:	Atrial fibrillation, new onset  Assessment and plan of treatment:	Continue current medications  No AC  Follow-up with your primary care doctor within 1 week.  Secondary Diagnosis:	Lung mass  Assessment and plan of treatment:	Supportive care  Family wants no further work-up   Patient is DNR/DNI
